# Patient Record
Sex: MALE | Race: WHITE | Employment: OTHER | URBAN - METROPOLITAN AREA
[De-identification: names, ages, dates, MRNs, and addresses within clinical notes are randomized per-mention and may not be internally consistent; named-entity substitution may affect disease eponyms.]

---

## 2021-12-15 ENCOUNTER — HOSPITAL ENCOUNTER (OUTPATIENT)
Dept: PHYSICAL THERAPY | Age: 66
Discharge: HOME | End: 2021-12-15
Payer: MEDICARE

## 2021-12-15 PROCEDURE — 97162 PT EVAL MOD COMPLEX 30 MIN: CPT

## 2021-12-15 PROCEDURE — 97110 THERAPEUTIC EXERCISES: CPT

## 2021-12-15 NOTE — PROGRESS NOTES
[address] [facility] PHYSICAL THERAPY AT [facility] [address], [address], [address]  Phone: [phone]  Fax: [phone] / [address] [address] PHYSICAL THERAPY SERVICES  Patient Name: Lizzette Waggoner : 1955   Medical   Diagnosis: Right shoulder pain [M25.511] Treatment Diagnosis: S/p R reverse TSA   Onset Date: 2021     Referral Source: Aylin Ceron* Start of Care [facility]): 12/15/2021   Prior Hospitalization: See medical history Provider #: [id number]   Prior Level of Function: Shooting, sports, walking, and ADL's without pain   Comorbidities: High blood pressure, non-specific tremors. Medications: Verified on Patient Summary List   The Plan of Care and following information is based on the information from the initial evaluation.   ===========================================================================================  Assessment / key information:  Patient is a 78 y.o. male who presents to [facility] with complaints of R shoulder pain following R shoulder reverse TSA on 2021. Patient reports onset of symptoms on 2021 following a fall onto R shoulder causing 10/10 pain. Pt reports that since surgery, pain has remained achy and low around 2-3/10 and that he is compliant with sling use and RUE positioning while at rest. Pt has been performing elbow and wrist AAROM daily but has not attempted pendulums and has wife help with donning/doffing sling. Incision appears clean and healing well although there is yellow/purple bruising and swelling throughout arm, elbow and forearm; reports he is monitoring daily and that is is slowly improving. At initial session, pt was able to achieve PROM of 60 deg flexion and near neutral ER. Patient sings and sx are consistent with pain and discomfort following R shoulder reverse TSA. An initial HEP was provided to address above deficits.  Physical Therapy services will be beneficial in order to decrease pain, improve ROM, strength and functional abilities in order to return to recreational activities and PLOF.   ===========================================================================================  Eval Complexity: History MEDIUM  Complexity : 1-2 comorbidities / personal factors will impact the outcome/ POC ;  Examination  MEDIUM Complexity : 3 Standardized tests and measures addressing body structure, function, activity limitation and / or participation in recreation ; Presentation MEDIUM Complexity : Evolving with changing characteristics ; Decision Making MEDIUM Complexity : FOTO score of 26-74; Overall Complexity MEDIUM  Problem List: pain affecting function, decrease ROM, decrease strength, edema affecting function, impaired gait/ balance, decrease ADL/ functional abilitiies, decrease activity tolerance, decrease flexibility/ joint mobility and decrease transfer abilities   Treatment Plan may include any combination of the following: Therapeutic exercise, Therapeutic activities, Neuromuscular re-education, Physical agent/modality, Gait/balance training, Manual therapy, Patient education, Self Care training, Functional mobility training, Home safety training and Stair training  Patient / Family readiness to learn indicated by: asking questions, trying to perform skills and interest  Persons(s) to be included in education: patient (P)  Barriers to Learning/Limitations: None  Measures taken, if barriers to learning:    Patient Goal (s): \"movement/flexibility\"   Patient self reported health status: good  Rehabilitation Potential: good   Short Term Goals: To be accomplished in  4  weeks:  1. Patient will be independent and compliant with initial HEP. 2. Patient will report decreased pain levels to 0/10 in order to sleep through the night pain-free  3.  Demonstrate improved R shoulder PROM to 120 deg flexion and 40 deg ER in order to improve ease with dressing   Long Term Goals: To be accomplished in  8  weeks:  1. Patient will be independent and compliant with progressive HEP for R shoulder ROM/strength in order to maintain gains made with physical therapy. 2. Improve FOTO score from 28 to > or = 57 in order to indicate  improved ease with ADLs. 3. Demonstrate improved R shoulder strength to globally 4/5 in order to improve ease with recreational activity. ,   Frequency / Duration:   Patient to be seen  2  times per week for 4-6  weeks:  Patient / Caregiver education and instruction: self care, activity modification, brace/ splint application and exercises  Therapist Signature: Veronica Page DPT Date: 67/43/7041   Certification Period: 12/15/2021-3/13/2021 Time: 12:30 PM   ===========================================================================================  I certify that the above Physical Therapy Services are being furnished while the patient is under my care. I agree with the treatment plan and certify that this therapy is necessary. Physician Signature:        Date:       Time:     Please sign and return to [facility] [place] or you may fax the signed copy to [phone]. Thank you.     Danuta Amador

## 2021-12-16 NOTE — PROGRESS NOTES
PHYSICAL THERAPY - DAILY TREATMENT NOTE    Patient Name: Cristhian Solis        Date: 2021  : 1955   YES Patient  Verified  Visit #:     Insurance: Payor: Harsh Gonzáles / Plan: [place] MEDICARE PART A & B / Product Type: Medicare /      In time: 525 Out time: 120   Total Treatment Time: 50     BCBS/Medicare Time Tracking (below)   Total Timed Codes (min):  40 1:1 Treatment Time:  40     TREATMENT AREA =  Right shoulder pain [M25.511]    SUBJECTIVE  Pain Level (on 0 to 10 scale):  2-3  / 10   Medication Changes/New allergies or changes in medical history, any new surgeries or procedures? NO    If yes, update Summary List   Subjective Functional Status/Changes:  []  No changes reported     Patient is a 78 y.o. male who presents to [facility] with complaints of R shoulder pain following R shoulder reverse TSA on 2021. Modalities Rationale:     decrease edema, decrease inflammation and decrease pain to improve patient's ability to perform pain-free ADLs.     min [] Estim, type/location:                                      []  att     []  unatt     []  w/US     []  w/ice    []  w/heat    min []  Mechanical Traction: type/lbs                   []  pro   []  sup   []  int   []  cont    []  before manual    []  after manual    min []  Ultrasound, settings/location:      min []  Iontophoresis w/ dexamethasone, location:                                               []  take home patch       []  in clinic   10 min [x]  Ice     []  Heat    location/position: R shoulder supine    min []  Vasopneumatic Device, press/temp:    If using vaso (only need to measure limb vaso being performed on)      pre-treatment girth :       post-treatment girth :       measured at (landmark location) :      min []  Other:    [] Skin assessment post-treatment (if applicable):    []  intact    []  redness- no adverse reaction                  []redness  adverse reaction:        15 min Therapeutic Exercise:  [x]  See flow sheet   Rationale:      increase ROM, increase strength, improve coordination and improve balance to improve the patients ability to perform unlimited ADLs. Billed With/As:   [x] TE   [] TA   [] Neuro   [] Self Care Patient Education: [x] Review HEP    [] Progressed/Changed HEP based on:   [] positioning   [] body mechanics   [] transfers   [] heat/ice application    [] other:      Other Objective/Functional Measures:    FOTO 28     Post Treatment Pain Level (on 0 to 10) scale:   2  / 10     ASSESSMENT  Assessment/Changes in Function:     See POC     []  See Progress Note/Recertification   Patient will continue to benefit from skilled PT services to modify and progress therapeutic interventions, address functional mobility deficits, address ROM deficits, address strength deficits, analyze and address soft tissue restrictions, analyze and cue movement patterns, analyze and modify body mechanics/ergonomics and assess and modify postural abnormalities to attain remaining goals. Progress toward goals / Updated goals:    See POC for new short and long term goals.       PLAN  [x]  Upgrade activities as tolerated YES Continue plan of care   []  Discharge due to :    []  Other:      Therapist: Avani Garcia DPT    Date: 12/16/2021 Time: 1:58 PM     Future Appointments   Date Time Provider Danny Cortes   12/17/2021 12:15 PM [facility] PT GUDELIA 1 [facility]   12/21/2021 11:45 AM Jordi Jarrell, PT [facility]   12/23/2021 11:45 AM Jordi Jarrell, PT [facility]   12/28/2021 11:45 AM Jordi Jarrell, PT [facility]   12/30/2021 11:45 AM Jordi Jarrell, PT [facility]   1/4/2022 12:45 PM Jordi Jarrell, PT [facility]   1/6/2022 11:45 AM Jordi Jarrell, PT [facility]   1/11/2022 12:45 PM Jordi Jarrell, PT [facility]   1/13/2022  2:15 PM Jordi Jarrell, PT [facility]   1/18/2022 12:45 PM Jordi Jarrell, PT [facility]   1/20/2022 11:15 AM [facility] PT LILLIANALL 1 [facility]   1/25/2022 10:30 AM Bartolo Chowdary, PT [facility]   1/27/2022 11:45 AM Bartolo Chowdary PT [facility]

## 2021-12-17 ENCOUNTER — HOSPITAL ENCOUNTER (OUTPATIENT)
Dept: PHYSICAL THERAPY | Age: 66
Discharge: HOME | End: 2021-12-17
Payer: MEDICARE

## 2021-12-17 PROCEDURE — 97140 MANUAL THERAPY 1/> REGIONS: CPT

## 2021-12-17 PROCEDURE — 97110 THERAPEUTIC EXERCISES: CPT

## 2021-12-17 NOTE — PROGRESS NOTES
PHYSICAL THERAPY - DAILY TREATMENT NOTE     Patient Name: Christofer Mays        Date: 2021  : 1955   YES Patient  Verified  Visit #:     Insurance: Payor: Veto Schwartz / Plan: [place] MEDICARE PART A & B / Product Type: Medicare /      In time: 6166 Out time:    Total Treatment Time: 40     Medicare/BCBS Time Tracking (below)   Total Timed Codes (min):  40 1:1 Treatment Time:  30     TREATMENT AREA =  Right shoulder pain [M25.511]    SUBJECTIVE    Pain Level (on 0 to 10 scale):  1-2  / 10   Medication Changes/New allergies or changes in medical history, any new surgeries or procedures?     NO    If yes, update Summary List   Subjective Functional Status/Changes:  []  No changes reported       Functional improvements: \"I've been doing the exercises at home\"  Functional impairments: Decreased ROM and strength affecting all ADL's         OBJECTIVE  Modalities Rationale:     decrease edema, decrease inflammation and decrease pain to improve patient's ability to perform ADL's w/ less pain      min [] Estim, type/location:                                      []  att     []  unatt     []  w/US     []  w/ice    []  w/heat    min []  Mechanical Traction: type/lbs                   []  pro   []  sup   []  int   []  cont    []  before manual    []  after manual    min []  Ultrasound, settings/location:      min []  Iontophoresis w/ dexamethasone, location:                                               []  take home patch       []  in clinic   10 min [x]  Ice     []  Heat    location/position: Supine to R shoulder    min []  Vasopneumatic Device, press/temp:  If using vaso (only need to measure limb vaso being performed on)      pre-treatment girth :       post-treatment girth :       measured at (landmark location) :       min []  Other:    [x] Skin assessment post-treatment (if applicable):    [x]  intact    [x]  redness- no adverse reaction     []redness  adverse reaction:      10 min Therapeutic Exercise:  [x]  See flow sheet   Rationale:      increase ROM and increase strength to improve the patients ability to perform ADL's and decrease edema     20 min Manual Therapy: Technique:      [x] S/DTM []IASTM [x]PROM [x] Passive Stretching   []manual TPR    []Jt manipulation:Gr I [] II []  III [] IV[] V[]  Treatment Area:  [facility] for edema management in forearm. PROM to R elbow and shoulder   Rationale:      decrease pain, increase ROM, increase tissue extensibility and decrease edema  to improve patient's ability to prepare for AAROM and ADL's. The manual therapy interventions were performed at a separate and distinct time from the therapeutic activities interventions. Billed With/As:   [x] TE   [] TA   [] Neuro   [] Self Care Patient Education: [x] Review HEP    [] Progressed/Changed HEP based on:   [] positioning   [] body mechanics   [] transfers   [] heat/ice application    [] other:        Other Objective/Functional Measures:    Initiated therex per flow sheet  Flex 80 deg PROM   Post Treatment Pain Level (on 0 to 10) scale:   1  / 10     ASSESSMENT    Assessment/Changes in Function:     Improved PROM noted today. Continues to have increased muscle guarding and edema in R UE.     []  See Progress Note/Recertification   Patient will continue to benefit from skilled PT services to modify and progress therapeutic interventions, address functional mobility deficits, address ROM deficits, address strength deficits, analyze and address soft tissue restrictions, analyze and cue movement patterns, analyze and modify body mechanics/ergonomics and assess and modify postural abnormalities to attain remaining goals.       Progress toward goals / Updated goals:    Progressing towards newly established goals     PLAN    [x]  Upgrade activities as tolerated YES Continue plan of care   []  Discharge due to :    []  Other:      Therapist: Dangelo Rasmussen, PT    Date: 12/17/2021 Time: 9:02 AM     Future Appointments   Date Time Provider Danny Cortes   12/17/2021 12:15 PM [facility] [place] [facility]   12/21/2021 11:45 AM Holden Maria, PT [facility]   12/23/2021 11:45 AM Holden Maria, PT [facility]   12/28/2021 11:45 AM Holden Maria, PT [facility]   12/30/2021 11:45 AM Holden Maria, PT [facility]   1/4/2022 12:45 PM Holden Maria, PT [facility]   1/6/2022 11:45 AM Holden Maria, PT [facility]   1/11/2022 12:45 PM [facility]   1/13/2022  2:15 PM Holden Maria, PT [facility]   1/18/2022 12:45 PM Holden Maria, PT [facility]   1/20/2022 11:15 AM [facility] PT RED[place]LL 1 [facility]   1/25/2022 10:30 AM Holden Maria, PT [facility]   1/27/2022 11:45 AM Holden Maria, PT [facility]

## 2021-12-20 ENCOUNTER — APPOINTMENT (OUTPATIENT)
Dept: PHYSICAL THERAPY | Age: 66
End: 2021-12-20
Payer: MEDICARE

## 2021-12-21 ENCOUNTER — HOSPITAL ENCOUNTER (OUTPATIENT)
Dept: PHYSICAL THERAPY | Age: 66
Discharge: HOME | End: 2021-12-21
Payer: MEDICARE

## 2021-12-21 PROCEDURE — 97110 THERAPEUTIC EXERCISES: CPT

## 2021-12-21 PROCEDURE — 97140 MANUAL THERAPY 1/> REGIONS: CPT

## 2021-12-21 NOTE — PROGRESS NOTES
PHYSICAL THERAPY - DAILY TREATMENT NOTE    Patient Name: Cesar Cornelius        Date: 2021  : 1955   YES Patient  Verified  Visit #:   3   of   12  Insurance: Payor: Kristen Hazard / Plan: [place] MEDICARE PART A & B / Product Type: Medicare /      In time: 680 Out time: 9430   Total Treatment Time: 45     BCBS/Medicare Time Tracking (below)   Total Timed Codes (min):  35 1:1 Treatment Time:  35     TREATMENT AREA =  Right shoulder pain [M25.511]    SUBJECTIVE  Pain Level (on 0 to 10 scale):  1  / 10   Medication Changes/New allergies or changes in medical history, any new surgeries or procedures? NO    If yes, update Summary List   Subjective Functional Status/Changes:  []  No changes reported     I dont have much pain and my bruising is going down a lot. Modalities Rationale:     decrease edema, decrease inflammation and decrease pain to improve patient's ability to perform pain-free ADLs.     min [] Estim, type/location:                                      []  att     []  unatt     []  w/US     []  w/ice    []  w/heat    min []  Mechanical Traction: type/lbs                   []  pro   []  sup   []  int   []  cont    []  before manual    []  after manual    min []  Ultrasound, settings/location:      min []  Iontophoresis w/ dexamethasone, location:                                               []  take home patch       []  in clinic   10 min [x]  Ice     []  Heat    location/position: R shoulder supine    min []  Vasopneumatic Device, press/temp:    If using vaso (only need to measure limb vaso being performed on)      pre-treatment girth :       post-treatment girth :       measured at (landmark location) :      min []  Other:    [x] Skin assessment post-treatment (if applicable):    [x]  intact    [x]  redness- no adverse reaction                  []redness  adverse reaction:        20 min Therapeutic Exercise:  [x]  See flow sheet   Rationale:      increase ROM, increase strength and improve coordination to improve the patients ability to perform unlimited ADLs. 15 min Manual Therapy: PROM into elevation and ER with oscillations to improve muscle relaxation, MFR and skin rolling along RUE to improve blood flow    Rationale:      decrease pain, increase ROM, increase tissue extensibility and decrease edema  to improve patient's ability to improve mobility per protocol  The manual therapy interventions were performed at a separate and distinct time from the therapeutic activities interventions. Billed With/As:   [x] TE   [] TA   [] Neuro   [] Self Care Patient Education: [x] Review HEP    [] Progressed/Changed HEP based on:   [] positioning   [] body mechanics   [] transfers   [] heat/ice application    [] other:      Other Objective/Functional Measures:    Achieved 90 deg flex and approx neutral ER with manual treatment    Palpation to pec tendon improved pt ability to relax during manual stretches     Post Treatment Pain Level (on 0 to 10) scale:   0  / 10     ASSESSMENT  Assessment/Changes in Function:     Continues to require cues with pendulums to allow for appropriate muscle relaxation to achieve stretch. Good tolerance to exercises without reports of pain     []  See Progress Note/Recertification   Patient will continue to benefit from skilled PT services to modify and progress therapeutic interventions, address functional mobility deficits, address ROM deficits, address strength deficits, analyze and address soft tissue restrictions, analyze and cue movement patterns, analyze and modify body mechanics/ergonomics and assess and modify postural abnormalities to attain remaining goals.    Progress toward goals / Updated goals:    Progressing towards STG 3,      PLAN  [x]  Upgrade activities as tolerated YES Continue plan of care   []  Discharge due to :    []  Other:      Therapist: Avani Garcia DPT    Date: 12/21/2021 Time: 11:52 AM     Future Appointments   Date Time Provider Department Center   12/23/2021 11:45 AM [facility]   12/28/2021 11:45 AM Leanora Poser, PT [facility]   12/30/2021 11:45 AM Leanora Nadeenr, PT [facility]   1/4/2022 12:45 PM Leanora Nadeenr, PT [facility]   1/6/2022 11:45 AM Leanora Nadeenr, PT [facility]   1/11/2022 12:45 PM Yunierora Katie, PT [facility]   1/13/2022  2:15 PM Tatum Wilson, PT [facility]   1/18/2022 12:45 PM Leanora Nadeenr, PT [facility]   1/20/2022 11:15 AM [facility] PT GUDELIA 1 [facility]   1/25/2022 10:30 AM Tatum Wilson, PT [facility]   1/27/2022 11:45 AM Leanora Katie, PT [facility]

## 2021-12-23 ENCOUNTER — HOSPITAL ENCOUNTER (OUTPATIENT)
Dept: PHYSICAL THERAPY | Age: 66
Discharge: HOME | End: 2021-12-23
Payer: MEDICARE

## 2021-12-23 PROCEDURE — 97110 THERAPEUTIC EXERCISES: CPT

## 2021-12-23 PROCEDURE — 97140 MANUAL THERAPY 1/> REGIONS: CPT

## 2021-12-23 NOTE — PROGRESS NOTES
PHYSICAL THERAPY - DAILY TREATMENT NOTE    Patient Name: Agusto Last        Date: 2021  : 1955   YES Patient  Verified  Visit #:     Insurance: Payor: Ayde Braswell / Plan: [place] & B / Product Type: Medicare /      In time: 4556 Out time: 0   Total Treatment Time: 45     BCBS/Medicare Time Tracking (below)   Total Timed Codes (min):  35 1:1 Treatment Time:  35     TREATMENT AREA =  Right shoulder pain [M25.511]    SUBJECTIVE  Pain Level (on 0 to 10 scale):  1  / 10   Medication Changes/New allergies or changes in medical history, any new surgeries or procedures? NO    If yes, update Summary List   Subjective Functional Status/Changes:  []  No changes reported     I was a little achy after last time but it went away with another round of ice         Modalities Rationale:     decrease edema, decrease inflammation and decrease pain to improve patient's ability to perform pain-free ADLs.     min [] Estim, type/location:                                      []  att     []  unatt     []  w/US     []  w/ice    []  w/heat    min []  Mechanical Traction: type/lbs                   []  pro   []  sup   []  int   []  cont    []  before manual    []  after manual    min []  Ultrasound, settings/location:      min []  Iontophoresis w/ dexamethasone, location:                                               []  take home patch       []  in clinic   10 min [x]  Ice     []  Heat    location/position: R shoulder supine    min []  Vasopneumatic Device, press/temp:    If using vaso (only need to measure limb vaso being performed on)      pre-treatment girth :       post-treatment girth :       measured at (landmark location) :      min []  Other:    [x] Skin assessment post-treatment (if applicable):    [x]  intact    [x]  redness- no adverse reaction                  []redness  adverse reaction:        20 min Therapeutic Exercise:  [x]  See flow sheet   Rationale:      increase ROM, increase strength and improve coordination to improve the patients ability to perform unlimited ADLs. 15 min Manual Therapy: PROM into elevation and ER with oscillations to improve muscle relaxation, MFR and skin rolling along RUE to improve blood flow    Rationale:      decrease pain, increase ROM, increase tissue extensibility and decrease edema  to improve patient's ability to improve mobility per protocol  The manual therapy interventions were performed at a separate and distinct time from the therapeutic activities interventions. Billed With/As:   [x] TE   [] TA   [] Neuro   [] Self Care Patient Education: [x] Review HEP    [] Progressed/Changed HEP based on:   [] positioning   [] body mechanics   [] transfers   [] heat/ice application    [] other:      Other Objective/Functional Measures:    Achieved 90 deg flex and approx 5-8 deg ER at 30 deg abd    Palpation to pec tendon improved pt ability to relax during manual stretches     Post Treatment Pain Level (on 0 to 10) scale:   0  / 10     ASSESSMENT  Assessment/Changes in Function:     Improved tolerance to manual stretches today and was able to achieve full PROM during pendulums. []  See Progress Note/Recertification   Patient will continue to benefit from skilled PT services to modify and progress therapeutic interventions, address functional mobility deficits, address ROM deficits, address strength deficits, analyze and address soft tissue restrictions, analyze and cue movement patterns, analyze and modify body mechanics/ergonomics and assess and modify postural abnormalities to attain remaining goals.    Progress toward goals / Updated goals:    Progressing towards STG 3,      PLAN  [x]  Upgrade activities as tolerated YES Continue plan of care   []  Discharge due to :    []  Other:      Therapist: Minh Vega DPT    Date: 12/23/2021 Time: 11:52 AM     Future Appointments   Date Time Provider Danny Cortes   12/28/2021 11:45 AM Carlton Brown, PT [facility] [place] [facility]   12/30/2021 11:45 AM Carlton Brown, PT [facility]   1/4/2022 12:45 PM Carlton Brown, PT [facility]   1/6/2022 11:45 AM Carlton Brown, PT [facility]   1/11/2022 12:45 PM Carlton Brown, PT [facility]   1/13/2022  2:15 PM Carlton Brown, PT [facility]   1/18/2022 12:45 PM Carlton Brown, PT [facility]   1/20/2022 11:15 AM [facility] PT [facility]   1/25/2022 10:30 AM Carlton Brown, PT [facility]   1/27/2022 11:45 AM Carlton Brown, PT [facility]

## 2021-12-27 ENCOUNTER — APPOINTMENT (OUTPATIENT)
Dept: PHYSICAL THERAPY | Age: 66
End: 2021-12-27
Payer: MEDICARE

## 2021-12-28 ENCOUNTER — HOSPITAL ENCOUNTER (OUTPATIENT)
Dept: PHYSICAL THERAPY | Age: 66
Discharge: HOME | End: 2021-12-28
Payer: MEDICARE

## 2021-12-28 PROCEDURE — 97110 THERAPEUTIC EXERCISES: CPT

## 2021-12-28 PROCEDURE — 97140 MANUAL THERAPY 1/> REGIONS: CPT

## 2021-12-28 NOTE — PROGRESS NOTES
PHYSICAL THERAPY - DAILY TREATMENT NOTE    Patient Name: Brayden Benitez        Date: 2021  : 1955   YES Patient  Verified  Visit #:     Insurance: Payor: Yvonne Palomo / Plan: [place] MEDICARE PART A & B / Product Type: Medicare /      In time: 1140 Out time: 1225   Total Treatment Time: 45     BCBS/Medicare Time Tracking (below)   Total Timed Codes (min):  35 1:1 Treatment Time:  35     TREATMENT AREA =  Right shoulder pain [M25.511]    SUBJECTIVE  Pain Level (on 0 to 10 scale):  1  / 10   Medication Changes/New allergies or changes in medical history, any new surgeries or procedures? NO    If yes, update Summary List   Subjective Functional Status/Changes:  []  No changes reported     I feel less resting pain and the swelling/bruising is getting better. Modalities Rationale:     decrease edema, decrease inflammation and decrease pain to improve patient's ability to perform pain-free ADLs.     min [] Estim, type/location:                                      []  att     []  unatt     []  w/US     []  w/ice    []  w/heat    min []  Mechanical Traction: type/lbs                   []  pro   []  sup   []  int   []  cont    []  before manual    []  after manual    min []  Ultrasound, settings/location:      min []  Iontophoresis w/ dexamethasone, location:                                               []  take home patch       []  in clinic   10 min [x]  Ice     []  Heat    location/position: R shoulder seated    min []  Vasopneumatic Device, press/temp:    If using vaso (only need to measure limb vaso being performed on)      pre-treatment girth :       post-treatment girth :       measured at (landmark location) :      min []  Other:    [x] Skin assessment post-treatment (if applicable):    [x]  intact    [x]  redness- no adverse reaction                  []redness  adverse reaction:        20 min Therapeutic Exercise:  [x]  See flow sheet   Rationale:      increase ROM, increase strength, improve coordination and increase proprioception to improve the patients ability to perform unlimited ADLs. 15 min Manual Therapy: PROM into elevation and ER with oscillations to improve muscle relaxation, MFR and skin rolling along RUE to improve blood flow    Rationale:      decrease pain, increase ROM, increase tissue extensibility and decrease edema  to improve patient's ability to improve ROM per protocol  The manual therapy interventions were performed at a separate and distinct time from the therapeutic activities interventions. Billed With/As:   [x] TE   [] TA   [] Neuro   [] Self Care Patient Education: [x] Review HEP    [] Progressed/Changed HEP based on:   [] positioning   [] body mechanics   [] transfers   [] heat/ice application    [] other:      Other Objective/Functional Measures: Added chair walk back and shoulder isometrics     Post Treatment Pain Level (on 0 to 10) scale:   0  / 10     ASSESSMENT  Assessment/Changes in Function:     Progressed PROM per protocol as pt is 3 weeks post op today. Tolerated exercises well but continues to require cues to promote appropriate muscle relaxation and prevent guarding. []  See Progress Note/Recertification   Patient will continue to benefit from skilled PT services to modify and progress therapeutic interventions, address functional mobility deficits, address ROM deficits, address strength deficits, analyze and address soft tissue restrictions, analyze and cue movement patterns, analyze and modify body mechanics/ergonomics and assess and modify postural abnormalities to attain remaining goals. Progress toward goals / Updated goals:    Progressing towards STG 3.       PLAN  [x]  Upgrade activities as tolerated YES Continue plan of care   []  Discharge due to :    []  Other:      Therapist: Joseph Rutledge DPT    Date: 12/28/2021 Time: 11:45 AM     Future Appointments   Date Time Provider Danny Cortes   12/30/2021 11:45 AM [facility] [place] [facility]   1/4/2022 12:45 PM Adeline Perry, PT [facility]   1/6/2022 11:45 AM Adeline Perry, PT [facility]   1/11/2022 12:45 PM Adeline Perry, PT [facility]   1/13/2022  2:15 PM Adeline Perry, PT [facility]   1/18/2022 12:45 PM Adeline Perry, PT [facility]   1/20/2022 11:15 AM [facility]   1/25/2022 10:30 AM Adeline Perry, PT [facility]   1/27/2022 11:45 AM Adeline Perry, PT [facility]

## 2021-12-29 ENCOUNTER — APPOINTMENT (OUTPATIENT)
Dept: PHYSICAL THERAPY | Age: 66
End: 2021-12-29
Payer: MEDICARE

## 2021-12-30 ENCOUNTER — HOSPITAL ENCOUNTER (OUTPATIENT)
Dept: PHYSICAL THERAPY | Age: 66
Discharge: HOME | End: 2021-12-30
Payer: MEDICARE

## 2021-12-30 PROCEDURE — 97110 THERAPEUTIC EXERCISES: CPT

## 2021-12-30 PROCEDURE — 97140 MANUAL THERAPY 1/> REGIONS: CPT

## 2021-12-30 NOTE — PROGRESS NOTES
PHYSICAL THERAPY - DAILY TREATMENT NOTE    Patient Name: Lynda Floyd        Date: 2021  : 1955   YES Patient  Verified  Visit #:     Insurance: Payor: Mayela Alegria / Plan: [place] MEDICARE PART A & B / Product Type: Medicare /      In time: 1340 Out time: 2618   Total Treatment Time: 45     BCBS/Medicare Time Tracking (below)   Total Timed Codes (min):  35 1:1 Treatment Time:  35     TREATMENT AREA =  Right shoulder pain [M25.511]    SUBJECTIVE  Pain Level (on 0 to 10 scale):  1  / 10   Medication Changes/New allergies or changes in medical history, any new surgeries or procedures? NO    If yes, update Summary List   Subjective Functional Status/Changes:  []  No changes reported     A little sore after last time but it went away after ice. Modalities Rationale:     decrease edema, decrease inflammation and decrease pain to improve patient's ability to perform pain-free ADLs.     min [] Estim, type/location:                                      []  att     []  unatt     []  w/US     []  w/ice    []  w/heat    min []  Mechanical Traction: type/lbs                   []  pro   []  sup   []  int   []  cont    []  before manual    []  after manual    min []  Ultrasound, settings/location:      min []  Iontophoresis w/ dexamethasone, location:                                               []  take home patch       []  in clinic   10 min [x]  Ice     []  Heat    location/position: R shoulder seated    min []  Vasopneumatic Device, press/temp:    If using vaso (only need to measure limb vaso being performed on)      pre-treatment girth :       post-treatment girth :       measured at (landmark location) :      min []  Other:    [x] Skin assessment post-treatment (if applicable):    [x]  intact    [x]  redness- no adverse reaction                  []redness  adverse reaction:        20 min Therapeutic Exercise:  [x]  See flow sheet   Rationale:      increase ROM, increase strength, improve coordination and increase proprioception to improve the patients ability to perform unlimited ADLs. 15 min Manual Therapy: PROM into elevation and ER with oscillations to improve muscle relaxation, MFR and skin rolling along RUE to improve blood flow    Rationale:      decrease pain, increase ROM, increase tissue extensibility and decrease edema  to improve patient's ability to improve ROM per protocol  The manual therapy interventions were performed at a separate and distinct time from the therapeutic activities interventions. Billed With/As:   [x] TE   [] TA   [] Neuro   [] Self Care Patient Education: [x] Review HEP    [] Progressed/Changed HEP based on:   [] positioning   [] body mechanics   [] transfers   [] heat/ice application    [] other:      Other Objective/Functional Measures:    Increased reps with chair walk back and shoulder isometrics    105 deg flexion and 20 deg ER with manual treatment     Post Treatment Pain Level (on 0 to 10) scale:   0  / 10     ASSESSMENT  Assessment/Changes in Function:     Good tolerance to progressed exercises today without reports of pain; improved muscle relaxation during manual treatment. []  See Progress Note/Recertification   Patient will continue to benefit from skilled PT services to modify and progress therapeutic interventions, address functional mobility deficits, address ROM deficits, address strength deficits, analyze and address soft tissue restrictions, analyze and cue movement patterns, analyze and modify body mechanics/ergonomics and assess and modify postural abnormalities to attain remaining goals. Progress toward goals / Updated goals:    Progressing towards STG 3.       PLAN  [x]  Upgrade activities as tolerated YES Continue plan of care   []  Discharge due to :    []  Other:      Therapist: Fauzia Becerra DPT    Date: 12/30/2021 Time: 11:45 AM     Future Appointments   Date Time Provider Danny Cortes   1/4/2022 12:45 PM Jaye Escalante [place] [facility]   1/6/2022 11:45 AM Adrian Saldaña, PT [facility]   1/11/2022 12:45 PM Adrian Saldaña, PT [facility]   1/13/2022  2:15 PM Adrian Saldaña, PT [facility]   1/18/2022 12:45 PM Adrian Saldaña, PT [facility]   1/20/2022 11:15 AM [facility]   1/25/2022 10:30 AM Adrian Saldaña, PT [facility]   1/27/2022 11:45 AM Adrian Saldaña, PT [facility]

## 2022-01-04 ENCOUNTER — HOSPITAL ENCOUNTER (OUTPATIENT)
Dept: PHYSICAL THERAPY | Age: 67
Discharge: HOME | End: 2022-01-04
Payer: MEDICARE

## 2022-01-04 PROCEDURE — 97110 THERAPEUTIC EXERCISES: CPT

## 2022-01-04 PROCEDURE — 97140 MANUAL THERAPY 1/> REGIONS: CPT

## 2022-01-04 NOTE — PROGRESS NOTES
PHYSICAL THERAPY - DAILY TREATMENT NOTE     Patient Name: Van Talavera        Date: 2022  : 1955   YES Patient  Verified  Visit #:     Insurance: Payor: Dagoberto Abbot / Plan: [place] MEDICARE PART A & B / Product Type: Medicare /      In time: 9149 Out time: 130   Total Treatment Time: 45     Medicare/BCBS Time Tracking (below)   Total Timed Codes (min):  45 1:1 Treatment Time:  35     TREATMENT AREA =  Right shoulder pain [M25.511]    SUBJECTIVE    Pain Level (on 0 to 10 scale):  1  / 10   Medication Changes/New allergies or changes in medical history, any new surgeries or procedures? NO    If yes, update Summary List   Subjective Functional Status/Changes:  []  No changes reported       Functional improvements: \"I see the doctor on Monday.   I'm doing better with the swelling in my arm\"  Functional impairments: Decreased strength and ROM affecting ADL's         OBJECTIVE  Modalities Rationale:     decrease inflammation and decrease pain to improve patient's ability to perform ADL's w/o pain      min [] Estim, type/location:                                      []  att     []  unatt     []  w/US     []  w/ice    []  w/heat    min []  Mechanical Traction: type/lbs                   []  pro   []  sup   []  int   []  cont    []  before manual    []  after manual    min []  Ultrasound, settings/location:      min []  Iontophoresis w/ dexamethasone, location:                                               []  take home patch       []  in clinic   10 min [x]  Ice     []  Heat    location/position: Supine to R shoulder    min []  Vasopneumatic Device, press/temp:  If using vaso (only need to measure limb vaso being performed on)      pre-treatment girth :       post-treatment girth :       measured at (landmark location) :       min []  Other:    [x] Skin assessment post-treatment (if applicable):    [x]  intact    [x]  redness- no adverse reaction     []redness  adverse reaction:      25 min Therapeutic Exercise:  [x]  See flow sheet   Rationale:      increase ROM and increase strength to improve the patients ability to prepare for AAROM     10 min Manual Therapy: Technique:      [] S/DTM []IASTM [x]PROM [x] Passive Stretching   []manual TPR    []Jt manipulation:Gr I [] II []  III [] IV[] V[]  Treatment Area:  R shoulder   Rationale:      increase ROM and increase tissue extensibility to improve patient's ability to perform ADL's. The manual therapy interventions were performed at a separate and distinct time from the therapeutic activities interventions. Billed With/As:   [x] TE   [] TA   [] Neuro   [] Self Care Patient Education: [x] Review HEP    [] Progressed/Changed HEP based on:   [] positioning   [] body mechanics   [] transfers   [] heat/ice application    [] other:        Other Objective/Functional Measures:    Increased light resistance to elbow flexion/extension, wrist flex/ext, increased gripper strength     Post Treatment Pain Level (on 0 to 10) scale:   0  / 10     ASSESSMENT    Assessment/Changes in Function:     Patient progressing well with PROM and strength. Continues to have increased muscle guarding with PROM     []  See Progress Note/Recertification   Patient will continue to benefit from skilled PT services to modify and progress therapeutic interventions, address functional mobility deficits, address ROM deficits, address strength deficits, analyze and address soft tissue restrictions, analyze and cue movement patterns, analyze and modify body mechanics/ergonomics and assess and modify postural abnormalities to attain remaining goals. Progress toward goals / Updated goals:    1. Patient will be independent and compliant with initial HEP. MET  2. Patient will report decreased pain levels to 0/10 in order to sleep through the night pain-free. Good Progress  3. Demonstrate improved R shoulder PROM to 120 deg flexion and 40 deg ER in order to improve ease with dressing.  Good Progress       PLAN    [x]  Upgrade activities as tolerated YES Continue plan of care   []  Discharge due to :    [x]  Other: PN N/V     Therapist: Truong Yancey PT    Date: 1/4/2022 Time: 9:34 AM     Future Appointments   Date Time Provider Danny Cortes   1/4/2022 12:45 PM [facility] [place]   1/6/2022 11:45 AM [facility] [place]   1/11/2022 12:45 PM Tommye Gene, PT [facility]   1/13/2022  2:15 PM Tommye Gene, [place] [facility] [place] [place] [facility]   1/18/2022 12:45 PM Tommye Gene, PT [facility] [place] [facility]   1/20/2022 11:15 AM [facility] [place]   1/25/2022 10:30 AM Tommye Gene, [place] [facility] [place] [facility]   1/27/2022 11:45 AM Tommye Gene, PT [facility]

## 2022-01-06 ENCOUNTER — HOSPITAL ENCOUNTER (OUTPATIENT)
Dept: PHYSICAL THERAPY | Age: 67
Discharge: HOME | End: 2022-01-06
Payer: MEDICARE

## 2022-01-06 PROCEDURE — 97140 MANUAL THERAPY 1/> REGIONS: CPT

## 2022-01-06 PROCEDURE — 97110 THERAPEUTIC EXERCISES: CPT

## 2022-01-06 NOTE — PROGRESS NOTES
[address] [facility] PHYSICAL THERAPY AT [address] [place] [address]  [facility] 38, [address] [place] [address], [address] [facility] [address]  Phone: [phone]  Fax: [phone] [place] [address] THERAPY          Patient Name: Diana Sebastian : 1955   Treatment/Medical Diagnosis: Right shoulder pain [M25.511]   Onset Date: 21    Referral Source: Russell Bland* Start of Care [facility]): 12/15/21   Prior Hospitalization: See Medical History Provider #: [id number]   Prior Level of Function: Shooting, sports, walking, ADL's without pain   Comorbidities: HTN, non-specific tremors   Medications: Verified on Patient Summary List   Visits from [facility]: 8 Missed Visits: 0     SUMMARY OF TREATMENT  Initiation of HEP, instruction in self care and precautions, manual therapy for PROM, therapeutic exercise for ROM and strengthening, modalities: cryotherapy. CURRENT STATUS  Mr. Blake Watson has made good gains in his physical therapy consistently reporting improved pain and decreased swelling in the R upper extremity. He continues to report mild edema in the lower part of his R UE and mild pain after having his arm out of his sling for a while. His current PROM is as follows: Flex: 110 deg,, ER: 35 deg. Previous Goals:  1. Patient will be independent and compliant with initial HEP. 2. Patient will report decreased pain levels to 0/10 in order to sleep through the night pain-free  3. Demonstrate improved R shoulder PROM to 120 deg flexion and 40 deg ER in order to improve ease with dressing     Prior Level/Current Level:  1) Prior Level: N/A   Current Level: Independent current HEP   Goal Met? yes  2) Prior Level: 2-3/10   Current Level: 1-2/10   Goal Met? yes  3) Prior Level: 60 degrees flexion, Neutral ER   Current Level: 110 deg Flex, 35 deg ER   Goal Met?  Progressing well    Problem List: pain affecting function, decrease ROM, decrease strength, decrease ADL/ functional abilitiies, decrease activity tolerance and decrease flexibility/ joint mobility   Treatment Plan may include any combination of the following: Therapeutic exercise, Therapeutic activities, Neuromuscular re-education, Physical agent/modality, Manual therapy, Patient education and Self Care training  Patient Goal(s) has been updated and includes:      Goals for this certification period include and are to be achieved in   4-8  weeks:  1. Patient will be independent and compliant with progressive HEP for R shoulder ROM/strength in order to maintain gains made with physical therapy. 2. Improve FOTO score from 28 to > or = 57 in order to indicate  improved ease with ADLs. 3. Demonstrate improved R shoulder strength to globally 4/5 in order to improve ease with recreational activity. ,     Frequency / Duration:   Patient to be seen   2   times per week for   4-8    weeks:    Assessments/Recommendations: Continue PT 2x/wk for 4-8 more weeks to continue progress towards all LTG's  If you have any questions/comments please contact us directly at [phone]. Thank you for allowing us to assist in the care of your patient. Therapist Signature: Savannah Brown PT Date: 7/0/8007   Certification Period:  Reporting Period: 1/6/22 to 4/6/22  12/15/21 to 1/6/22 Time: 8:42 AM   NOTE TO PHYSICIAN:  PLEASE COMPLETE THE ORDERS BELOW AND FAX TO   [facility] Physical Therapy: [phone]  If you are unable to process this request in 24 hours please contact our office: [phone]      ___ I have read the above report and request that my patient continue as recommended.   ___ I have read the above report and request that my patient continue therapy with the following changes/special instructions: ________________________________________________   ___ I have read the above report and request that my patient be discharged from therapy.      I certify that the above Physical Therapy Services are being furnished while the patient is under my care. I agree with the treatment plan and certify that this therapy is necessary. Physician Signature:        Date:       Time:        Joann Barker*  Please sign and return to [facility] or you may fax the signed copy to [phone]. Thank you.

## 2022-01-06 NOTE — PROGRESS NOTES
PHYSICAL THERAPY - DAILY TREATMENT NOTE     Patient Name: Queenie Sunshine        Date: 2022  : 1955   YES Patient  Verified  Visit #:     Insurance: Payor: Paty Stanton / Plan: [place] MEDICARE PART A & B / Product Type: Medicare /      In time: 102 Out time: 572   Total Treatment Time: 45     Medicare/BCBS Time Tracking (below)   Total Timed Codes (min):  45 1:1 Treatment Time:  35     TREATMENT AREA =  Right shoulder pain [M25.511]    SUBJECTIVE    Pain Level (on 0 to 10 scale):  1-2  / 10   Medication Changes/New allergies or changes in medical history, any new surgeries or procedures?     NO    If yes, update Summary List   Subjective Functional Status/Changes:  []  No changes reported       Functional improvements: \"It's ok, sore today\"  Functional impairments: Decreased ROM and strength affecting ADL's         OBJECTIVE  Modalities Rationale:     decrease inflammation and decrease pain to improve patient's ability to perform ADL's w/ less pain      min [] Estim, type/location:                                      []  att     []  unatt     []  w/US     []  w/ice    []  w/heat    min []  Mechanical Traction: type/lbs                   []  pro   []  sup   []  int   []  cont    []  before manual    []  after manual    min []  Ultrasound, settings/location:      min []  Iontophoresis w/ dexamethasone, location:                                               []  take home patch       []  in clinic   10 min [x]  Ice     []  Heat    location/position: Supine to R shoulder    min []  Vasopneumatic Device, press/temp:  If using vaso (only need to measure limb vaso being performed on)      pre-treatment girth :       post-treatment girth :       measured at (landmark location) :       min []  Other:    [x] Skin assessment post-treatment (if applicable):    [x]  intact    [x]  redness- no adverse reaction     []redness  adverse reaction:      20 min Therapeutic Exercise:  [x]  See flow sheet Rationale:      increase ROM and increase strength to improve the patients ability to prepare for AAROM and perform ADL's     15 min Manual Therapy: Technique:      [] S/DTM []IASTM [x]PROM [x] Passive Stretching   []manual TPR    []Jt manipulation:Gr I [] II []  III [] IV[] V[]  Treatment Area:     Rationale:      increase ROM to improve patient's ability to perform ADL's. The manual therapy interventions were performed at a separate and distinct time from the therapeutic activities interventions. Billed With/As:   [x] TE   [] TA   [] Neuro   [] Self Care Patient Education: [x] Review HEP    [] Progressed/Changed HEP based on:   [] positioning   [] body mechanics   [] transfers   [] heat/ice application    [] other:        Other Objective/Functional Measures:    See PN   Post Treatment Pain Level (on 0 to 10) scale:   1  / 10     ASSESSMENT    Assessment/Changes in Function:     See PN     [x]  See Progress Note/Recertification   Patient will continue to benefit from skilled PT services to modify and progress therapeutic interventions, address functional mobility deficits, address ROM deficits, address strength deficits, analyze and address soft tissue restrictions, analyze and cue movement patterns, analyze and modify body mechanics/ergonomics and assess and modify postural abnormalities to attain remaining goals.       Progress toward goals / Updated goals:    See PN     PLAN    [x]  Upgrade activities as tolerated YES Continue plan of care   []  Discharge due to :    []  Other:      Therapist: Rodriguez Talbot PT    Date: 1/6/2022 Time: 8:37 AM     Future Appointments   Date Time Provider Danny Cortes   1/6/2022 11:45 AM [facility]   1/11/2022 12:45 PM Mei Dhillon, PT [facility]   1/13/2022  2:15 PM Mei Dhillon, PT [facility]   1/18/2022 12:45 PM Mei Dhillon, PT [facility]   1/20/2022 11:15 AM [facility] PT [facility]   1/25/2022 10:30 AM Mei Dhillon, PT [facility] 1/27/2022 11:45 AM Parth Carrillo PT [facility]

## 2022-01-11 ENCOUNTER — HOSPITAL ENCOUNTER (OUTPATIENT)
Dept: PHYSICAL THERAPY | Age: 67
Discharge: HOME | End: 2022-01-11
Payer: MEDICARE

## 2022-01-11 PROCEDURE — 97140 MANUAL THERAPY 1/> REGIONS: CPT

## 2022-01-11 PROCEDURE — 97110 THERAPEUTIC EXERCISES: CPT

## 2022-01-11 NOTE — PROGRESS NOTES
PHYSICAL THERAPY - DAILY TREATMENT NOTE    Patient Name: Teto Solomon        Date: 2022  : 1955   YES Patient  Verified  Visit #:     Insurance: Payor: Darcy Garcia / Plan: [place] MEDICARE PART A & B / Product Type: Medicare /      In time:  Out time: 135   Total Treatment Time: 50     BCBS/Medicare Time Tracking (below)   Total Timed Codes (min):  40 1:1 Treatment Time:  40     TREATMENT AREA =  Right shoulder pain [M25.511]    SUBJECTIVE  Pain Level (on 0 to 10 scale):  1-2  / 10   Medication Changes/New allergies or changes in medical history, any new surgeries or procedures? NO    If yes, update Summary List   Subjective Functional Status/Changes:  []  No changes reported     I have been a little achy from being out of the sling but its nothing uncomfortable          Modalities Rationale:     decrease edema, decrease inflammation, decrease pain and increase tissue extensibility to improve patient's ability to perform pain-free ADLs.     min [] Estim, type/location:                                      []  att     []  unatt     []  w/US     []  w/ice    []  w/heat    min []  Mechanical Traction: type/lbs                   []  pro   []  sup   []  int   []  cont    []  before manual    []  after manual    min []  Ultrasound, settings/location:      min []  Iontophoresis w/ dexamethasone, location:                                               []  take home patch       []  in clinic   10 min [x]  Ice     []  Heat    location/position: Seated to R shoulder    min []  Vasopneumatic Device, press/temp:    If using vaso (only need to measure limb vaso being performed on)      pre-treatment girth :       post-treatment girth :       measured at (landmark location) :      min []  Other:    [x] Skin assessment post-treatment (if applicable):    [x]  intact    [x]  redness- no adverse reaction                  []redness  adverse reaction:        25 min Therapeutic Exercise:  [x]  See flow sheet   Rationale:      increase ROM, increase strength, improve coordination and increase proprioception to improve the patients ability to perform unlimited ADLs. 15 min Manual Therapy: PROM into flexion and ER in the scapular plane with oscillatoins to promote muscle relaxation   Rationale:      decrease pain, increase ROM, increase tissue extensibility and decrease edema  to improve patient's ability to improve PROM per protocol  The manual therapy interventions were performed at a separate and distinct time from the therapeutic activities interventions. Billed With/As:   [x] TE   [] TA   [] Neuro   [] Self Care Patient Education: [x] Review HEP    [] Progressed/Changed HEP based on:   [] positioning   [] body mechanics   [] transfers   [] heat/ice application    [] other:      Other Objective/Functional Measures: Add Isometric IR/ER at wall     Post Treatment Pain Level (on 0 to 10) scale:   1  / 10     ASSESSMENT  Assessment/Changes in Function:     Improved tolerance to manual stretches with less muscle guarding. Educated pt about post op protocol and weaning from sling; reports he is still sleeping with it on.      []  See Progress Note/Recertification   Patient will continue to benefit from skilled PT services to modify and progress therapeutic interventions, address functional mobility deficits, address ROM deficits, address strength deficits, analyze and address soft tissue restrictions, analyze and cue movement patterns, analyze and modify body mechanics/ergonomics and assess and modify postural abnormalities to attain remaining goals. Progress toward goals / Updated goals:    Progressing towards LTG 3.       PLAN  [x]  Upgrade activities as tolerated YES Continue plan of care   []  Discharge due to :    []  Other:      Therapist: Jaycee Pendleton DPT    Date: 1/11/2022 Time: 12:51 PM     Future Appointments   Date Time Provider Danny Cortes   1/13/2022  2:15 PM Matt Saenz, PT [facility]   1/18/2022 12:45 PM Atiya Aguila, PT [facility]   1/20/2022 11:15 AM [facility] PT [place] [facility]   1/25/2022 10:30 AM Atiya Aguila, PT [facility]   1/27/2022 11:45 AM Atiya Aguila, PT [facility]

## 2022-01-13 ENCOUNTER — HOSPITAL ENCOUNTER (OUTPATIENT)
Dept: PHYSICAL THERAPY | Age: 67
Discharge: HOME | End: 2022-01-13
Payer: MEDICARE

## 2022-01-13 PROCEDURE — 97140 MANUAL THERAPY 1/> REGIONS: CPT

## 2022-01-13 PROCEDURE — 97110 THERAPEUTIC EXERCISES: CPT

## 2022-01-13 NOTE — PROGRESS NOTES
PHYSICAL THERAPY - DAILY TREATMENT NOTE    Patient Name: Nat Tracy        Date: 2022  : 1955   YES Patient  Verified  Visit #:   10   of   12  Insurance: Payor: Jad Wolf / Plan: [place] MEDICARE PART A & B / Product Type: Medicare /      In time: 215 Out time: 305   Total Treatment Time: 50     BCBS/Medicare Time Tracking (below)   Total Timed Codes (min):  40 1:1 Treatment Time:  40     TREATMENT AREA =  Right shoulder pain [M25.511]    SUBJECTIVE  Pain Level (on 0 to 10 scale):  1-2   10   Medication Changes/New allergies or changes in medical history, any new surgeries or procedures? NO    If yes, update Summary List   Subjective Functional Status/Changes:  []  No changes reported     I put the sling on yesterday because I was a little sore. Im achy pretty consistently since taking the sling off. Modalities Rationale:     decrease edema, decrease inflammation, decrease pain and increase tissue extensibility to improve patient's ability to perform pain-free ADLs.     min [] Estim, type/location:                                      []  att     []  unatt     []  w/US     []  w/ice    []  w/heat    min []  Mechanical Traction: type/lbs                   []  pro   []  sup   []  int   []  cont    []  before manual    []  after manual    min []  Ultrasound, settings/location:      min []  Iontophoresis w/ dexamethasone, location:                                               []  take home patch       []  in clinic   10 min [x]  Ice     []  Heat    location/position: Seated to R shoulder    min []  Vasopneumatic Device, press/temp:    If using vaso (only need to measure limb vaso being performed on)      pre-treatment girth :       post-treatment girth :       measured at (landmark location) :      min []  Other:    [x] Skin assessment post-treatment (if applicable):    [x]  intact    [x]  redness- no adverse reaction                  []redness  adverse reaction:        30 min Therapeutic Exercise:  [x]  See flow sheet   Rationale:      increase ROM, increase strength, improve coordination and increase proprioception to improve the patients ability to perform unlimited ADLs. 10 min Manual Therapy: PROM into flexion and ER in the scapular plane with oscillatoins to promote muscle relaxation   Rationale:      decrease pain, increase ROM, increase tissue extensibility and decrease edema  to improve patient's ability to improve PROM per protocol  The manual therapy interventions were performed at a separate and distinct time from the therapeutic activities interventions. Billed With/As:   [x] TE   [] TA   [] Neuro   [] Self Care Patient Education: [x] Review HEP    [] Progressed/Changed HEP based on:   [] positioning   [] body mechanics   [] transfers   [] heat/ice application    [] other:      Other Objective/Functional Measures: Added dowel flexion and ER     Post Treatment Pain Level (on 0 to 10) scale:   1  / 10     ASSESSMENT  Assessment/Changes in Function:     Pt fatigued quickly with AAROM exercises with visible juddering/shaking at end ranges. Good tolerance to isometrics; plan to add more exercises next visit after assessing symptoms after beginning phase II of protocol. []  See Progress Note/Recertification   Patient will continue to benefit from skilled PT services to modify and progress therapeutic interventions, address functional mobility deficits, address ROM deficits, address strength deficits, analyze and address soft tissue restrictions, analyze and cue movement patterns, analyze and modify body mechanics/ergonomics and assess and modify postural abnormalities to attain remaining goals. Progress toward goals / Updated goals:    Progressing towards LTG 3.       PLAN  [x]  Upgrade activities as tolerated YES Continue plan of care   []  Discharge due to :    []  Other:      Therapist: Joseph Flynn DPT    Date: 1/13/2022 Time: 12:51 PM     Future Appointments   Date Time Provider Department Center   1/18/2022 12:45 PM [facility]   1/20/2022 11:15 AM [facility] PT [place] [facility]   1/25/2022 10:30 AM Atiya Aguila, PT [facility]   1/27/2022 11:45 AM Atiya Aguila, PT [facility]

## 2022-01-18 ENCOUNTER — HOSPITAL ENCOUNTER (OUTPATIENT)
Dept: PHYSICAL THERAPY | Age: 67
Discharge: HOME | End: 2022-01-18
Payer: MEDICARE

## 2022-01-18 PROCEDURE — 97110 THERAPEUTIC EXERCISES: CPT

## 2022-01-18 PROCEDURE — 97140 MANUAL THERAPY 1/> REGIONS: CPT

## 2022-01-18 NOTE — PROGRESS NOTES
PHYSICAL THERAPY - DAILY TREATMENT NOTE    Patient Name: Parul Eid        Date: 2022  : 1955   YES Patient  Verified  Visit #:     Insurance: Payor: Shirley Counter / Plan: [place] MEDICARE PART A & B / Product Type: Medicare /      In time: 3369 Out time: 1210   Total Treatment Time: 55     BCBS/Medicare Time Tracking (below)   Total Timed Codes (min):  45 1:1 Treatment Time:  45     TREATMENT AREA =  Right shoulder pain [M25.511]    SUBJECTIVE  Pain Level (on 0 to 10 scale):  1-2   10   Medication Changes/New allergies or changes in medical history, any new surgeries or procedures? NO    If yes, update Summary List   Subjective Functional Status/Changes:  []  No changes reported     I have been sore and achy since adding new stuff. Modalities Rationale:     decrease edema, decrease inflammation, decrease pain and increase tissue extensibility to improve patient's ability to perform pain-free ADLs.     min [] Estim, type/location:                                      []  att     []  unatt     []  w/US     []  w/ice    []  w/heat    min []  Mechanical Traction: type/lbs                   []  pro   []  sup   []  int   []  cont    []  before manual    []  after manual    min []  Ultrasound, settings/location:      min []  Iontophoresis w/ dexamethasone, location:                                               []  take home patch       []  in clinic   10 min [x]  Ice     []  Heat    location/position: Seated to R shoulder    min []  Vasopneumatic Device, press/temp:    If using vaso (only need to measure limb vaso being performed on)      pre-treatment girth :       post-treatment girth :       measured at (landmark location) :      min []  Other:    [x] Skin assessment post-treatment (if applicable):    [x]  intact    [x]  redness- no adverse reaction                  []redness  adverse reaction:        35 min Therapeutic Exercise:  [x]  See flow sheet   Rationale:      increase ROM, increase strength, improve coordination and increase proprioception to improve the patients ability to perform unlimited ADLs. 10 min Manual Therapy: PROM into flexion and ER in the scapular plane with oscillatoins to promote muscle relaxation   Rationale:      decrease pain, increase ROM, increase tissue extensibility and decrease edema  to improve patient's ability to improve PROM per protocol  The manual therapy interventions were performed at a separate and distinct time from the therapeutic activities interventions. Billed With/As:   [x] TE   [] TA   [] Neuro   [] Self Care Patient Education: [x] Review HEP    [] Progressed/Changed HEP based on:   [] positioning   [] body mechanics   [] transfers   [] heat/ice application    [] other:      Other Objective/Functional Measures: Added post capsule stretch, prone row and pulleys      Post Treatment Pain Level (on 0 to 10) scale:   1  / 10     ASSESSMENT  Assessment/Changes in Function:     Pt continues to report fatigue during all AAROM exercises and requires constant cues to prevent UT compensations and excessive scapular protraction. Continues to show difficulty relaxing during manual treatment; unable to reach similar range with manual stretches compared to during self-stretches. []  See Progress Note/Recertification   Patient will continue to benefit from skilled PT services to modify and progress therapeutic interventions, address functional mobility deficits, address ROM deficits, address strength deficits, analyze and address soft tissue restrictions, analyze and cue movement patterns, analyze and modify body mechanics/ergonomics and assess and modify postural abnormalities to attain remaining goals. Progress toward goals / Updated goals:    Progressing towards LTG 3.       PLAN  [x]  Upgrade activities as tolerated YES Continue plan of care   []  Discharge due to :    []  Other:      Therapist: Leslie Bruno DPT    Date: 1/18/2022 Time: 12:51 PM     Future Appointments   Date Time Provider Danny Cortes   1/20/2022 11:15 AM [facility] PT [place] [facility]   1/25/2022 10:30 AM Akilah Nayak, PT [facility]   1/27/2022 11:45 AM Akilah Nayak PT [facility]

## 2022-01-20 ENCOUNTER — HOSPITAL ENCOUNTER (OUTPATIENT)
Dept: PHYSICAL THERAPY | Age: 67
Discharge: HOME | End: 2022-01-20
Payer: MEDICARE

## 2022-01-20 PROCEDURE — 97140 MANUAL THERAPY 1/> REGIONS: CPT | Performed by: PHYSICAL THERAPIST

## 2022-01-20 PROCEDURE — 97110 THERAPEUTIC EXERCISES: CPT | Performed by: PHYSICAL THERAPIST

## 2022-01-20 NOTE — PROGRESS NOTES
PHYSICAL THERAPY - DAILY TREATMENT NOTE    Patient Name: Nixon Valdez        Date: 2022  : 1955   YES Patient  Verified  Visit #:     Insurance: Payor: David Guillens / Plan: [place] MED[place] & B / Product Type: Medicare /      In time: 3746X Out time: 1220pm   Total Treatment Time: 65     BCBS/Medicare Time Tracking (below)   Total Timed Codes (min):  55 1:1 Treatment Time:  43     TREATMENT AREA =  Right shoulder pain [M25.511]    SUBJECTIVE  Pain Level (on 0 to 10 scale): 1/ 10   Medication Changes/New allergies or changes in medical history, any new surgeries or procedures? NO    If yes, update Summary List   Subjective Functional Status/Changes:  []  No changes reported     My next MD appt is in ~3 weeks. Modalities Rationale:     decrease edema, decrease inflammation, decrease pain and increase tissue extensibility to improve patient's ability to perform pain-free ADLs.     min [] Estim, type/location:                                      []  att     []  unatt     []  w/US     []  w/ice    []  w/heat    min []  Mechanical Traction: type/lbs                   []  pro   []  sup   []  int   []  cont    []  before manual    []  after manual    min []  Ultrasound, settings/location:      min []  Iontophoresis w/ dexamethasone, location:                                               []  take home patch       []  in clinic   10 min [x]  Ice     []  Heat    location/position: Seated to R shoulder    min []  Vasopneumatic Device, press/temp:    If using vaso (only need to measure limb vaso being performed on)      pre-treatment girth :       post-treatment girth :       measured at (landmark location) :      min []  Other:    [x] Skin assessment post-treatment (if applicable):    [x]  intact    [x]  redness- no adverse reaction                  []redness  adverse reaction:        35/20 min Therapeutic Exercise:  [x]  See flow sheet   Rationale:      increase ROM, increase strength, improve coordination and increase proprioception to improve the patients ability to perform unlimited ADLs. 23 min Manual Therapy: PROM into flexion and ER in the scapular plane with oscillatoins to promote muscle relaxation, forearm and elbow stretches, mild scaption PROM. Rationale:      decrease pain, increase ROM, increase tissue extensibility and decrease edema  to improve patient's ability to improve PROM per protocol  The manual therapy interventions were performed at a separate and distinct time from the therapeutic activities interventions. Billed With/As:   [x] TE   [] TA   [] Neuro   [] Self Care Patient Education: [x] Review HEP    [] Progressed/Changed HEP based on:   [] positioning   [] body mechanics   [] transfers   [] heat/ice application    [] other:      Other Objective/Functional Measures: Added SL ER and ABD today. Pt requires min A with ABD, and cues for elbow tuck with ER. Added MT stretch to R forearm and elbow, mild scar massage, and elbow extension with arm tucked to side. Post Treatment Pain Level (on 0 to 10) scale:   1 / 10     ASSESSMENT  Assessment/Changes in Function:     Pt presents with weakness in SL Abd and requires min A for performance of new therex. Pt has some tightness in biceps and palpable knot in lower biceps. Pt did well with new therex for elbow. Pt educated in mild S for forearm. []  See Progress Note/Recertification   Patient will continue to benefit from skilled PT services to modify and progress therapeutic interventions, address functional mobility deficits, address ROM deficits, address strength deficits, analyze and address soft tissue restrictions, analyze and cue movement patterns, analyze and modify body mechanics/ergonomics and assess and modify postural abnormalities to attain remaining goals.    Progress toward goals / Updated goals:  Progressing towards LTG 3.   1. Patient will be independent and compliant with progressive HEP for R shoulder ROM/strength in order to maintain gains made with physical therapy. 2. Improve FOTO score from 28 to > or = 57 in order to indicate  improved ease with ADLs. 3. Demonstrate improved R shoulder strength to globally 4/5 in order to improve ease with recreational activity. ,        PLAN  [x]  Upgrade activities as tolerated YES Continue plan of care   []  Discharge due to :    []  Other:      Therapist: Pati MULLER    Date: 1/20/2022 Time: 12:51 PM     Future Appointments   Date Time Provider Danny Cortes   1/20/2022 11:15 AM [facility] PT [place] [facility]   1/25/2022 10:30 AM Jenness [place], PT [facility]   1/27/2022 11:45 AM Jenness [place], PT [facility]   2/1/2022 12:45 PM Jenness [place], PT [facility]   2/3/2022 11:45 AM Jenness [place], PT [facility]   2/8/2022 11:15 AM Jenness [place], PT [facility]   2/10/2022 11:45 AM Jenness [place], PT [facility]   2/15/2022  1:30 PM Jenness [place], PT [facility]   2/17/2022 11:45 AM Jenness [place], PT [facility]   2/22/2022 11:15 AM Jenness [place], PT [facility]   2/24/2022 11:45 AM Jenness [place], PT [facility]

## 2022-01-25 ENCOUNTER — HOSPITAL ENCOUNTER (OUTPATIENT)
Dept: PHYSICAL THERAPY | Age: 67
Discharge: HOME | End: 2022-01-25
Payer: MEDICARE

## 2022-01-25 PROCEDURE — 97110 THERAPEUTIC EXERCISES: CPT

## 2022-01-25 PROCEDURE — 97140 MANUAL THERAPY 1/> REGIONS: CPT

## 2022-01-25 NOTE — PROGRESS NOTES
PHYSICAL THERAPY - DAILY TREATMENT NOTE    Patient Name: Corina Westbrook        Date: 2022  : 1955   YES Patient  Verified  Visit #:     Insurance: Payor: Robin Javed / Plan: [place] MEDICARE PART A & B / Product Type: Medicare /      In time: 312 Out time:    Total Treatment Time: 55     BCBS/Medicare Time Tracking (below)   Total Timed Codes (min):  45 1:1 Treatment Time:  45     TREATMENT AREA =  Right shoulder pain [M25.511]    SUBJECTIVE  Pain Level (on 0 to 10 scale):  1-2  / 10   Medication Changes/New allergies or changes in medical history, any new surgeries or procedures? NO    If yes, update Summary List   Subjective Functional Status/Changes:  []  No changes reported     I have begun trying to  light items around the house but I find it harder to put them down. Modalities Rationale:     decrease edema, decrease inflammation and decrease pain to improve patient's ability to perform pain-free ADLs.     min [] Estim, type/location:                                      []  att     []  unatt     []  w/US     []  w/ice    []  w/heat    min []  Mechanical Traction: type/lbs                   []  pro   []  sup   []  int   []  cont    []  before manual    []  after manual    min []  Ultrasound, settings/location:      min []  Iontophoresis w/ dexamethasone, location:                                               []  take home patch       []  in clinic   10 min [x]  Ice     []  Heat    location/position: R shoulder supine    min []  Vasopneumatic Device, press/temp:    If using vaso (only need to measure limb vaso being performed on)      pre-treatment girth :       post-treatment girth :       measured at (landmark location) :      min []  Other:    [x] Skin assessment post-treatment (if applicable):    [x]  intact    [x]  redness- no adverse reaction                  []redness  adverse reaction:        35 min Therapeutic Exercise:  [x]  See flow sheet   Rationale: increase ROM and increase strength to improve the patients ability to perform unlimited ADLs. 10 min Manual Therapy: PROM into flexion, abd and ER at varying degrees of ABD   Rationale:      decrease pain, increase ROM and increase tissue extensibility to improve patient's ability to improve mobility  The manual therapy interventions were performed at a separate and distinct time from the therapeutic activities interventions. Billed With/As:   [x] TE   [] TA   [] Neuro   [] Self Care Patient Education: [x] Review HEP    [] Progressed/Changed HEP based on:   [] positioning   [] body mechanics   [] transfers   [] heat/ice application    [] other:      Other Objective/Functional Measures: Added SL abd, supine protraction/circles and  finger ladder     Post Treatment Pain Level (on 0 to 10) scale:   1  / 10     ASSESSMENT  Assessment/Changes in Function:     Good tolerance to new exercises but pt required cues to prevent UT compensations and allow for appropriate/true flexion ROM. Limited range noted into ER both passively and actively; updated HEP to include AAROM and gentle AROM exercises. []  See Progress Note/Recertification   Patient will continue to benefit from skilled PT services to modify and progress therapeutic interventions, address functional mobility deficits, address ROM deficits, address strength deficits, analyze and address soft tissue restrictions, analyze and cue movement patterns, analyze and modify body mechanics/ergonomics and assess and modify postural abnormalities to attain remaining goals. Progress toward goals / Updated goals:    Progressing towards LTG 3.       PLAN  [x]  Upgrade activities as tolerated YES Continue plan of care   []  Discharge due to :    []  Other:      Therapist: Yenny Grande DPT    Date: 1/25/2022 Time: 1:57 PM     Future Appointments   Date Time Provider Danny Cortes   1/27/2022 11:45 AM Natan Taveras, PT [facility] [place] [facility]   2/1/2022 12:45 PM Kenneth Castellanos, PT [facility]   2/3/2022 11:45 AM Kenneth Castellanos, PT [facility]   2/8/2022 11:15 AM Kenneth Castellanos, PT [facility]   2/10/2022 11:45 AM Kenneth Castellanos, PT [facility]   2/15/2022  1:30 PM Kenneth Castellanos, PT [facility]   2/17/2022 11:45 AM Kenneth Castellanos, PT [facility]   2/22/2022 11:15 AM Kenneth Castellanos, PT [facility]   2/24/2022 11:45 AM Kenneth Castellanos, PT [facility]

## 2022-01-27 ENCOUNTER — HOSPITAL ENCOUNTER (OUTPATIENT)
Dept: PHYSICAL THERAPY | Age: 67
Discharge: HOME | End: 2022-01-27
Payer: MEDICARE

## 2022-01-27 PROCEDURE — 97140 MANUAL THERAPY 1/> REGIONS: CPT

## 2022-01-27 PROCEDURE — 97110 THERAPEUTIC EXERCISES: CPT

## 2022-01-27 NOTE — PROGRESS NOTES
PHYSICAL THERAPY - DAILY TREATMENT NOTE    Patient Name: Tracy Cohen        Date: 2022  : 1955   YES Patient  Verified  Visit #:     Insurance: Payor: Alexyousif Javon / Plan: [place] MEDICARE PART A & B / Product Type: Medicare /      In time: 1150 Out time: 8   Total Treatment Time: 55     BCBS/Medicare Time Tracking (below)   Total Timed Codes (min):  45 1:1 Treatment Time:  45     TREATMENT AREA =  Right shoulder pain [M25.511]    SUBJECTIVE  Pain Level (on 0 to 10 scale):  1  / 10   Medication Changes/New allergies or changes in medical history, any new surgeries or procedures? NO    If yes, update Summary List   Subjective Functional Status/Changes:  []  No changes reported     I am still sleeping in my sling but I am going to try without it tonight. Modalities Rationale:     decrease edema, decrease inflammation and decrease pain to improve patient's ability to perform pain-free ADLs.     min [] Estim, type/location:                                      []  att     []  unatt     []  w/US     []  w/ice    []  w/heat    min []  Mechanical Traction: type/lbs                   []  pro   []  sup   []  int   []  cont    []  before manual    []  after manual    min []  Ultrasound, settings/location:      min []  Iontophoresis w/ dexamethasone, location:                                               []  take home patch       []  in clinic   10 min [x]  Ice     []  Heat    location/position: R shoulder supine    min []  Vasopneumatic Device, press/temp:    If using vaso (only need to measure limb vaso being performed on)      pre-treatment girth :       post-treatment girth :       measured at (landmark location) :      min []  Other:    [x] Skin assessment post-treatment (if applicable):    [x]  intact    [x]  redness- no adverse reaction                  []redness  adverse reaction:        35 min Therapeutic Exercise:  [x]  See flow sheet   Rationale:      increase ROM and increase strength to improve the patients ability to perform unlimited ADLs. 10 min Manual Therapy: PROM into flexion, abd and ER at varying degrees of ABD   Rationale:      decrease pain, increase ROM and increase tissue extensibility to improve patient's ability to improve mobility  The manual therapy interventions were performed at a separate and distinct time from the therapeutic activities interventions. Billed With/As:   [x] TE   [] TA   [] Neuro   [] Self Care Patient Education: [x] Review HEP    [] Progressed/Changed HEP based on:   [] positioning   [] body mechanics   [] transfers   [] heat/ice application    [] other:      Other Objective/Functional Measures:    Increased reps of multiple exercises     Post Treatment Pain Level (on 0 to 10) scale:   1  / 10     ASSESSMENT  Assessment/Changes in Function:     Pt requires frequent cues to prevent muscle guarding during manual and self-stretches. Pt experienced muscle twitching today throughout shoulder during gentle AROM exercises and required frequent rest breaks. No reports of pain throughout. []  See Progress Note/Recertification   Patient will continue to benefit from skilled PT services to modify and progress therapeutic interventions, address functional mobility deficits, address ROM deficits, address strength deficits, analyze and address soft tissue restrictions, analyze and cue movement patterns, analyze and modify body mechanics/ergonomics and assess and modify postural abnormalities to attain remaining goals. Progress toward goals / Updated goals:    Progressing towards LTG 3.       PLAN  [x]  Upgrade activities as tolerated YES Continue plan of care   []  Discharge due to :    []  Other:      Therapist: Jose Goss DPT    Date: 1/27/2022 Time: 1:57 PM     Future Appointments   Date Time Provider Danny Cortes   2/1/2022  9:45 AM Theresa Oneal PT [facility]   2/3/2022 11:45 AM Theresa Oneal PT [facility]   2/8/2022 11:15 AM Shaun Mullins, PT [facility]   2/10/2022 11:45 AM Shaun Mullins, PT [facility]   2/15/2022  1:30 PM Shaun Mullins, PT [facility]   2/17/2022 11:45 AM Shaun Mullins, PT [facility]   2/22/2022 11:15 AM Shaun Mullins, PT [facility]   2/24/2022 11:45 AM Shaun Mullins, PT [facility]

## 2022-02-01 ENCOUNTER — HOSPITAL ENCOUNTER (OUTPATIENT)
Dept: PHYSICAL THERAPY | Age: 67
Discharge: HOME | End: 2022-02-01
Payer: MEDICARE

## 2022-02-01 PROCEDURE — 97140 MANUAL THERAPY 1/> REGIONS: CPT

## 2022-02-01 PROCEDURE — 97110 THERAPEUTIC EXERCISES: CPT

## 2022-02-01 NOTE — PROGRESS NOTES
PHYSICAL THERAPY - DAILY TREATMENT NOTE    Patient Name: Flo Mcintyre        Date: 2022  : 1955   YES Patient  Verified  Visit #:   15   of   25  Insurance: Payor: Zurdo Memory / Plan: [place] MEDICARE PART A & B / Product Type: Medicare /      In time: 940 Out time:    Total Treatment Time: 55     BCBS/Medicare Time Tracking (below)   Total Timed Codes (min):  45 1:1 Treatment Time:  45     TREATMENT AREA =  Right shoulder pain [M25.511]    SUBJECTIVE  Pain Level (on 0 to 10 scale):  1  / 10   Medication Changes/New allergies or changes in medical history, any new surgeries or procedures? NO    If yes, update Summary List   Subjective Functional Status/Changes:  []  No changes reported     Stiffness remains but pain is low and feels better with stretches          Modalities Rationale:     decrease edema, decrease inflammation and decrease pain to improve patient's ability to perform pain-free ADLs.     min [] Estim, type/location:                                      []  att     []  unatt     []  w/US     []  w/ice    []  w/heat    min []  Mechanical Traction: type/lbs                   []  pro   []  sup   []  int   []  cont    []  before manual    []  after manual    min []  Ultrasound, settings/location:      min []  Iontophoresis w/ dexamethasone, location:                                               []  take home patch       []  in clinic   10 min [x]  Ice     []  Heat    location/position: R shoulder supine    min []  Vasopneumatic Device, press/temp:    If using vaso (only need to measure limb vaso being performed on)      pre-treatment girth :       post-treatment girth :       measured at (landmark location) :      min []  Other:    [x] Skin assessment post-treatment (if applicable):    [x]  intact    [x]  redness- no adverse reaction                  []redness  adverse reaction:        30 min Therapeutic Exercise:  [x]  See flow sheet   Rationale:      increase ROM and increase strength to improve the patients ability to perform unlimited ADLs. 15 min Manual Therapy: PROM into flexion, abd and ER at varying degrees of ABD   Rationale:      decrease pain, increase ROM and increase tissue extensibility to improve patient's ability to improve mobility  The manual therapy interventions were performed at a separate and distinct time from the therapeutic activities interventions. Billed With/As:   [x] TE   [] TA   [] Neuro   [] Self Care Patient Education: [x] Review HEP    [] Progressed/Changed HEP based on:   [] positioning   [] body mechanics   [] transfers   [] heat/ice application    [] other:      Other Objective/Functional Measures:    TE per FS    Improved to approx 110 deg flexion and 20 deg ER during manual stretches   Post Treatment Pain Level (on 0 to 10) scale:   1  / 10     ASSESSMENT  Assessment/Changes in Function:     Continues to show high levels of muscle guarding throughout entire session, improvement in range following pendulums and palpation to guarded muscle but pt continues to guard when new range is met. Encouraged to resume all passive stretching at home to improve capsular mobility. []  See Progress Note/Recertification   Patient will continue to benefit from skilled PT services to modify and progress therapeutic interventions, address functional mobility deficits, address ROM deficits, address strength deficits, analyze and address soft tissue restrictions, analyze and cue movement patterns, analyze and modify body mechanics/ergonomics and assess and modify postural abnormalities to attain remaining goals. Progress toward goals / Updated goals:    Progressing towards LTG 3.       PLAN  [x]  Upgrade activities as tolerated YES Continue plan of care   []  Discharge due to :    []  Other:      Therapist: Lelo Wagoner DPT    Date: 2/1/2022 Time: 1:57 PM     Future Appointments   Date Time Provider Danny Cortes   2/3/2022 11:45 AM Braden Newton PT [facility]   2/8/2022 11:15 AM Brayden Maher, PT [facility]   2/10/2022 11:45 AM Brayden Maher, PT [facility]   2/15/2022  1:30 PM Brayden Maher, PT [facility]   2/17/2022 11:45 AM Brayden Maher, PT [facility]   2/22/2022 11:15 AM Brayden Maher, PT [facility]   2/24/2022 11:45 AM Brayden Maher, PT [facility]

## 2022-02-03 ENCOUNTER — HOSPITAL ENCOUNTER (OUTPATIENT)
Dept: PHYSICAL THERAPY | Age: 67
Discharge: HOME | End: 2022-02-03
Payer: MEDICARE

## 2022-02-03 PROCEDURE — 97140 MANUAL THERAPY 1/> REGIONS: CPT

## 2022-02-03 PROCEDURE — 97110 THERAPEUTIC EXERCISES: CPT

## 2022-02-03 NOTE — PROGRESS NOTES
PHYSICAL THERAPY - DAILY TREATMENT NOTE    Patient Name: Teto Solomon        Date: 2/3/2022  : 1955   YES Patient  Verified  Visit #:     Insurance: Payor: Darcy Garcia / Plan: [place] MED[place] PART [place] & B / Product Type: Medicare /      In time: 7052 Out time: 88   Total Treatment Time: 55     BCBS/Medicare Time Tracking (below)   Total Timed Codes (min):  45 1:1 Treatment Time:  45     TREATMENT AREA =  Right shoulder pain [M25.511]    SUBJECTIVE  Pain Level (on 0 to 10 scale):  1  / 10   Medication Changes/New allergies or changes in medical history, any new surgeries or procedures? NO    If yes, update Summary List   Subjective Functional Status/Changes:  []  No changes reported     I only have pain at the end of my strethces, it aches at night. Modalities Rationale:     decrease edema, decrease inflammation and decrease pain to improve patient's ability to perform pain-free ADLs.     min [] Estim, type/location:                                      []  att     []  unatt     []  w/US     []  w/ice    []  w/heat    min []  Mechanical Traction: type/lbs                   []  pro   []  sup   []  int   []  cont    []  before manual    []  after manual    min []  Ultrasound, settings/location:      min []  Iontophoresis w/ dexamethasone, location:                                               []  take home patch       []  in clinic   10 min [x]  Ice     []  Heat    location/position: R shoulder supine    min []  Vasopneumatic Device, press/temp:    If using vaso (only need to measure limb vaso being performed on)      pre-treatment girth :       post-treatment girth :       measured at (landmark location) :      min []  Other:    [x] Skin assessment post-treatment (if applicable):    [x]  intact    [x]  redness- no adverse reaction                  []redness  adverse reaction:        30 min Therapeutic Exercise:  [x]  See flow sheet   Rationale:      increase ROM and increase strength to improve the patients ability to perform unlimited ADLs. 15 min Manual Therapy: PROM into flexion, abd and ER at varying degrees of ABD; compression to R shoulder prior to manual therapy to decrease muscle twitching/spasm; maintained throughout flexion ROM    Rationale:      decrease pain, increase ROM and increase tissue extensibility to improve patient's ability to improve mobility  The manual therapy interventions were performed at a separate and distinct time from the therapeutic activities interventions. Billed With/As:   [x] TE   [] TA   [] Neuro   [] Self Care Patient Education: [x] Review HEP    [] Progressed/Changed HEP based on:   [] positioning   [] body mechanics   [] transfers   [] heat/ice application    [] other:      Other Objective/Functional Measures:    TE per FS- encouraged pt to perform SL abd in scapular plane to avoid excessive shoulder extension    Achieved approx 120 deg flexion during manual stretches and pulleys   Post Treatment Pain Level (on 0 to 10) scale:   1  / 10     ASSESSMENT  Assessment/Changes in Function:     Improvement in range today with less muscle guarding noted during OH motion. Pt unable to tolerate AROM into elevation in supine today as recommended by protocol, will reassess NV to assess strength gains. []  See Progress Note/Recertification   Patient will continue to benefit from skilled PT services to modify and progress therapeutic interventions, address functional mobility deficits, address ROM deficits, address strength deficits, analyze and address soft tissue restrictions, analyze and cue movement patterns, analyze and modify body mechanics/ergonomics and assess and modify postural abnormalities to attain remaining goals. Progress toward goals / Updated goals:    Progressing towards LTG 3.       PLAN  [x]  Upgrade activities as tolerated YES Continue plan of care   []  Discharge due to :    []  Other:      Therapist: Joseph Rutledge DPT Date: 2/3/2022 Time: 1:57 PM     Future Appointments   Date Time Provider Danny Cortes   2/8/2022 11:15 AM Kenneth Castellanos, PT [facility]   2/10/2022 11:45 AM Kenneth Castellanos PT [facility]   2/15/2022  1:30 PM Kenneth Castellanos, PT [facility]   2/17/2022 11:45 AM Kenneth Castellanos PT [facility]   2/22/2022 11:15 AM Kenneth Castellanos PT [facility]   2/24/2022 11:45 AM Kenneth Castellanos, PT [facility]

## 2022-02-08 ENCOUNTER — HOSPITAL ENCOUNTER (OUTPATIENT)
Dept: PHYSICAL THERAPY | Age: 67
Discharge: HOME | End: 2022-02-08
Payer: MEDICARE

## 2022-02-08 PROCEDURE — 97110 THERAPEUTIC EXERCISES: CPT

## 2022-02-08 PROCEDURE — 97140 MANUAL THERAPY 1/> REGIONS: CPT

## 2022-02-08 NOTE — PROGRESS NOTES
PHYSICAL THERAPY - DAILY TREATMENT NOTE    Patient Name: Maeve Estes        Date: 2022  : 1955   YES Patient  Verified  Visit #:     Insurance: Payor: Aminta Lujan / Plan: [place] MEDICARE PART A & B / Product Type: Medicare /      In time: 5330 Out time: 7910   Total Treatment Time: 60     BCBS/Medicare Time Tracking (below)   Total Timed Codes (min):  50 1:1 Treatment Time:  45     TREATMENT AREA =  Right shoulder pain [M25.511]    SUBJECTIVE  Pain Level (on 0 to 10 scale):  1  / 10   Medication Changes/New allergies or changes in medical history, any new surgeries or procedures? NO    If yes, update Summary List   Subjective Functional Status/Changes:  []  No changes reported     Some of my exercises are getting easy. Modalities Rationale:     decrease edema, decrease inflammation and decrease pain to improve patient's ability to perform pain-free ADLs.     min [] Estim, type/location:                                      []  att     []  unatt     []  w/US     []  w/ice    []  w/heat    min []  Mechanical Traction: type/lbs                   []  pro   []  sup   []  int   []  cont    []  before manual    []  after manual    min []  Ultrasound, settings/location:      min []  Iontophoresis w/ dexamethasone, location:                                               []  take home patch       []  in clinic   10 min [x]  Ice     []  Heat    location/position: R shoulder supine    min []  Vasopneumatic Device, press/temp:    If using vaso (only need to measure limb vaso being performed on)      pre-treatment girth :       post-treatment girth :       measured at (landmark location) :      min []  Other:    [x] Skin assessment post-treatment (if applicable):    [x]  intact    [x]  redness- no adverse reaction                  []redness  adverse reaction:        35/30 min Therapeutic Exercise:  [x]  See flow sheet   Rationale:      increase ROM and increase strength to improve the patients ability to perform unlimited ADLs. 15 min Manual Therapy: PROM into flexion, abd and ER at varying degrees of ABD; compression to R shoulder prior to manual therapy to decrease muscle twitching/spasm; maintained throughout flexion ROM    Rationale:      decrease pain, increase ROM and increase tissue extensibility to improve patient's ability to improve mobility  The manual therapy interventions were performed at a separate and distinct time from the therapeutic activities interventions. Billed With/As:   [x] TE   [] TA   [] Neuro   [] Self Care Patient Education: [x] Review HEP    [] Progressed/Changed HEP based on:   [] positioning   [] body mechanics   [] transfers   [] heat/ice application    [] other:      Other Objective/Functional Measures: Added supine flexion punch AROM    Post Treatment Pain Level (on 0 to 10) scale:   1  / 10     ASSESSMENT  Assessment/Changes in Function:     See PN to MD     []  See Progress Note/Recertification   Patient will continue to benefit from skilled PT services to modify and progress therapeutic interventions, address functional mobility deficits, address ROM deficits, address strength deficits, analyze and address soft tissue restrictions, analyze and cue movement patterns, analyze and modify body mechanics/ergonomics and assess and modify postural abnormalities to attain remaining goals. Progress toward goals / Updated goals:    See PN for progress towards goals.       PLAN  [x]  Upgrade activities as tolerated YES Continue plan of care   []  Discharge due to :    []  Other:      Therapist: Syeda Davila DPT    Date: 2/8/2022 Time: 1:57 PM     Future Appointments   Date Time Provider Danny Cortes   2/10/2022 11:45 AM Mei Dhillon PT [facility]   2/15/2022  1:30 PM Mei Dhillon PT [facility]   2/17/2022 11:45 AM Mei Dhillon, PT [facility]   2/22/2022 11:15 AM Mei Dhillon PT [facility]   2/24/2022 11:45 AM Mei Dhillon, PT [facility]

## 2022-02-08 NOTE — PROGRESS NOTES
[address] [facility] PHYSICAL THERAPY AT [address] [place] [address]  Andrés Deleon [facility], [address] ,[address], [address]  Phone: [phone]  Fax: [phone]  PROGRESS NOTE  Patient Name: Gurdeep Helay : 1955   Treatment/Medical Diagnosis: Right shoulder pain [M25.511]   Referral Source: Dagmar Mckeon*     Date of Initial Visit: 12/15/2021 Attended Visits: 17 Missed Visits: 0     SUMMARY OF TREATMENT  PROM, AAROM and AROM of R shoulder to tolerance, stretching of R shoulder, gentle strengthening of deltoid,  RC musculature and scapular stabilizers, HEP prescription, patient education, manual therapy to improve mobility and CP for pain control/inflammation  CURRENT STATUS  Mr. Richard Yang has is 9 weeks s/p R reverse TSA on 2021. He has been making slow gains with PROM/AROM due to high levels of muscle guarding and involuntary tremors during self and manual stretches. Pt has shown greatest improvement in past 2 weeks and is tolerating initiation of AROM well with reports of soreness but no pain. Pt reports independence with light ADLs while maintaining weight restrictions per protocol. See below for objective measures:    Goal/Measure of Progress Goal Met? 1. Patient will be independent and compliant with progressive HEP for R shoulder ROM/strength in order to maintain gains made with physical therapy. Status at last Eval: Established  Current Status: independent with current program; compliant 5-6 days/week yes   2. Demonstrate improved R shoulder PROM to 120 deg flexion and 40 deg ER in order to improve ease with dressing   Status at last Eval: 110 deg Flex, 35 deg ER Current Status: 130 deg flex  35 deg ER in neutral Partially met   3. Improve FOTO score from 28 to > or = 57 in order to indicate  improved ease with ADLs. Status at last Eval: 28 Current Status: 57 yes     New Goals to be achieved in __4__  weeks:  1.   Patient will be independent and compliant with progressive HEP for R shoulder ROM/strength in order to maintain gains made with physical therapy. 2.  Report ability to resume light ADLs with pain remaining below 2/10 in order to improve functional mobility   3. Demonstrate improved R shoulder AROM to 120 deg flex and 90 deg Abd in order to improve reaching and grasping demands. 4.  Demonstrate improved R shoulder strength to globally 4/5 in order to improve ease with recreational activity. RECOMMENDATIONS  Continue with skilled PT services 2x/week for 4 more weeks in order to resume light recreational activity without pain. Thank you! If you have any questions/comments please contact us directly at [phone]. Thank you for allowing us to assist in the care of your patient. Therapist Signature: Lona Singletary DPT Date: 2/8/2022   Reporting period: 1/6/2022-2/8/2022 Time: 11:26 AM   NOTE TO PHYSICIAN:  PLEASE COMPLETE THE ORDERS BELOW AND FAX TO   [facility] [facility]: [phone]. If you are unable to process this request in 24 hours please contact our office: [phone].    ___ I have read the above report and request that my patient continue as recommended.   ___ I have read the above report and request that my patient continue therapy with the following changes/special instructions:_________________________________________________________   ___ I have read the above report and request that my patient be discharged from therapy. I certify that the above Physical Therapy Services are being furnished while the patient is under my care. I agree with the treatment plan and certify that this therapy is necessary.   Physician Signature:_______________________________________________  Date:____________________________     Time: _______________________________________  Paula Robb

## 2022-02-10 ENCOUNTER — APPOINTMENT (OUTPATIENT)
Dept: PHYSICAL THERAPY | Age: 67
End: 2022-02-10
Payer: MEDICARE

## 2022-02-15 ENCOUNTER — HOSPITAL ENCOUNTER (OUTPATIENT)
Dept: PHYSICAL THERAPY | Age: 67
Discharge: HOME | End: 2022-02-15
Payer: MEDICARE

## 2022-02-15 PROCEDURE — 97140 MANUAL THERAPY 1/> REGIONS: CPT

## 2022-02-15 PROCEDURE — 97110 THERAPEUTIC EXERCISES: CPT

## 2022-02-15 NOTE — PROGRESS NOTES
PHYSICAL THERAPY - DAILY TREATMENT NOTE    Patient Name: Markie Wilder        Date: 2/15/2022  : 1955   YES Patient  Verified  Visit #:     Insurance: Payor: Aldo Jones / Plan: [place] MEDICARE PART A & B / Product Type: Medicare /      In time: 130 Out time: 235   Total Treatment Time: 60     BCBS/Medicare Time Tracking (below)   Total Timed Codes (min):  50 1:1 Treatment Time:  45     TREATMENT AREA =  Right shoulder pain [M25.511]    SUBJECTIVE  Pain Level (on 0 to 10 scale):  1  / 10   Medication Changes/New allergies or changes in medical history, any new surgeries or procedures? NO    If yes, update Summary List   Subjective Functional Status/Changes:  []  No changes reported     I have started using my R arm more for shaving, brushing my hair and washing my hands. Modalities Rationale:     decrease edema, decrease inflammation and decrease pain to improve patient's ability to perform pain-free ADLs.     min [] Estim, type/location:                                      []  att     []  unatt     []  w/US     []  w/ice    []  w/heat    min []  Mechanical Traction: type/lbs                   []  pro   []  sup   []  int   []  cont    []  before manual    []  after manual    min []  Ultrasound, settings/location:      min []  Iontophoresis w/ dexamethasone, location:                                               []  take home patch       []  in clinic   10 min [x]  Ice     []  Heat    location/position: R shoulder supine    min []  Vasopneumatic Device, press/temp:    If using vaso (only need to measure limb vaso being performed on)      pre-treatment girth :       post-treatment girth :       measured at (landmark location) :      min []  Other:    [x] Skin assessment post-treatment (if applicable):    [x]  intact    [x]  redness- no adverse reaction                  []redness  adverse reaction:        35/30 min Therapeutic Exercise:  [x]  See flow sheet   Rationale:      increase ROM and increase strength to improve the patients ability to perform unlimited ADLs. 15 min Manual Therapy: PROM into flexion, abd and ER at varying degrees of ABD; compression to R shoulder prior to manual therapy to decrease muscle twitching/spasm; maintained throughout flexion ROM    Rationale:      decrease pain, increase ROM and increase tissue extensibility to improve patient's ability to improve mobility  The manual therapy interventions were performed at a separate and distinct time from the therapeutic activities interventions. Billed With/As:   [x] TE   [] TA   [] Neuro   [] Self Care Patient Education: [x] Review HEP    [] Progressed/Changed HEP based on:   [] positioning   [] body mechanics   [] transfers   [] heat/ice application    [] other:      Other Objective/Functional Measures: Added 15 deg incline to supine flexion punch AROM     Added AAROM with pulleys   Post Treatment Pain Level (on 0 to 10) scale:   1  / 10     ASSESSMENT  Assessment/Changes in Function:     Improved tolerance to AROM today but muscle guarding and thoracic movement prevent correct form; max cues required throughout all seated/standing exercises. Good tolerance to incline flexion with fair ER recruitment. []  See Progress Note/Recertification   Patient will continue to benefit from skilled PT services to modify and progress therapeutic interventions, address functional mobility deficits, address ROM deficits, address strength deficits, analyze and address soft tissue restrictions, analyze and cue movement patterns, analyze and modify body mechanics/ergonomics and assess and modify postural abnormalities to attain remaining goals. Progress toward goals / Updated goals:    Progressing towards LTG 3.       PLAN  [x]  Upgrade activities as tolerated YES Continue plan of care   []  Discharge due to :    []  Other:      Therapist: Michelle Flynn DPT    Date: 2/15/2022 Time: 1:57 PM     Future Appointments   Date Time Provider Department Center   2/17/2022 11:45 AM Kenneth Castellanos, PT [facility]   2/22/2022 11:15 AM Kenneth Castellanos, PT [facility]   2/24/2022 11:45 AM Kenneth Castellanos, PT [facility]

## 2022-02-17 ENCOUNTER — HOSPITAL ENCOUNTER (OUTPATIENT)
Dept: PHYSICAL THERAPY | Age: 67
Discharge: HOME | End: 2022-02-17
Payer: MEDICARE

## 2022-02-17 PROCEDURE — 97110 THERAPEUTIC EXERCISES: CPT

## 2022-02-17 PROCEDURE — 97140 MANUAL THERAPY 1/> REGIONS: CPT

## 2022-02-17 NOTE — PROGRESS NOTES
PHYSICAL THERAPY - DAILY TREATMENT NOTE    Patient Name: Denae Arriola        Date: 2022  : 1955   YES Patient  Verified  Visit #:     Insurance: Payor: Christofer Ovalle / Plan: [place] MEDICARE PART A & B / Product Type: Medicare /      In time: 966 Out time:    Total Treatment Time: 60     BCBS/Medicare Time Tracking (below)   Total Timed Codes (min):  50 1:1 Treatment Time:  45     TREATMENT AREA =  Right shoulder pain [M25.511]    SUBJECTIVE  Pain Level (on 0 to 10 scale):  1  / 10   Medication Changes/New allergies or changes in medical history, any new surgeries or procedures? NO    If yes, update Summary List   Subjective Functional Status/Changes:  []  No changes reported     A little achy after last time but not bad, I cant reach light switches with my R arm. Modalities Rationale:     decrease edema, decrease inflammation and decrease pain to improve patient's ability to perform pain-free ADLs.     min [] Estim, type/location:                                      []  att     []  unatt     []  w/US     []  w/ice    []  w/heat    min []  Mechanical Traction: type/lbs                   []  pro   []  sup   []  int   []  cont    []  before manual    []  after manual    min []  Ultrasound, settings/location:      min []  Iontophoresis w/ dexamethasone, location:                                               []  take home patch       []  in clinic   10 min [x]  Ice     []  Heat    location/position: R shoulder supine    min []  Vasopneumatic Device, press/temp:    If using vaso (only need to measure limb vaso being performed on)      pre-treatment girth :       post-treatment girth :       measured at (landmark location) :      min []  Other:    [x] Skin assessment post-treatment (if applicable):    [x]  intact    [x]  redness- no adverse reaction                  []redness  adverse reaction:        35/30 min Therapeutic Exercise:  [x]  See flow sheet   Rationale:      increase ROM and increase strength to improve the patients ability to perform unlimited ADLs. 15 min Manual Therapy: PROM into flexion and ER at varying degrees of ABD; gentle grade 1 PA mobs to GHJ   Rationale:      decrease pain, increase ROM and increase tissue extensibility to improve patient's ability to improve mobility  The manual therapy interventions were performed at a separate and distinct time from the therapeutic activities interventions. Billed With/As:   [x] TE   [] TA   [] Neuro   [] Self Care Patient Education: [x] Review HEP    [] Progressed/Changed HEP based on:   [] positioning   [] body mechanics   [] transfers   [] heat/ice application    [] other:      Other Objective/Functional Measures: Added 1# to supine protraction and standing scaption in front of mirror    Increased reps with multiple exercises   Post Treatment Pain Level (on 0 to 10) scale:   1  / 10     ASSESSMENT  Assessment/Changes in Function:     Good tolerance to manual treatment today with less spasms noted, improved range noted passively. Used mirror during pulleys today for visual cues for scapular elevation and muscle guarding; improved form with cues with less reports of discomfort. []  See Progress Note/Recertification   Patient will continue to benefit from skilled PT services to modify and progress therapeutic interventions, address functional mobility deficits, address ROM deficits, address strength deficits, analyze and address soft tissue restrictions, analyze and cue movement patterns, analyze and modify body mechanics/ergonomics and assess and modify postural abnormalities to attain remaining goals. Progress toward goals / Updated goals:    Progressing towards LTG 3.       PLAN  [x]  Upgrade activities as tolerated YES Continue plan of care   []  Discharge due to :    []  Other:      Therapist: Leslie Bruno DPT    Date: 2/17/2022 Time: 1:57 PM     Future Appointments   Date Time Provider Danny Cortes 2/22/2022 11:15 AM Beryle Harvard, PT [facility]   2/24/2022 11:45 AM Beryle Harvard, PT [facility]

## 2022-02-22 ENCOUNTER — HOSPITAL ENCOUNTER (OUTPATIENT)
Dept: PHYSICAL THERAPY | Age: 67
Discharge: HOME | End: 2022-02-22
Payer: MEDICARE

## 2022-02-22 PROCEDURE — 97110 THERAPEUTIC EXERCISES: CPT

## 2022-02-22 PROCEDURE — 97140 MANUAL THERAPY 1/> REGIONS: CPT

## 2022-02-22 NOTE — PROGRESS NOTES
PHYSICAL THERAPY - DAILY TREATMENT NOTE    Patient Name: Randy Ventura        Date: 2022  : 1955   YES Patient  Verified  Visit #:     Insurance: Payor: Lynette Rushing / Plan: [place] MED[place] & B / Product Type: Medicare /      In time: 45 Out time: 534   Total Treatment Time: 60     BCBS/Medicare Time Tracking (below)   Total Timed Codes (min):  50 1:1 Treatment Time:  45     TREATMENT AREA =  Right shoulder pain [M25.511]    SUBJECTIVE  Pain Level (on 0 to 10 scale):  1  / 10   Medication Changes/New allergies or changes in medical history, any new surgeries or procedures? NO    If yes, update Summary List   Subjective Functional Status/Changes:  []  No changes reported     I am trying to use my arm a little more around the house; putting dishes away gives me the most difficulty. Modalities Rationale:     decrease edema, decrease inflammation and decrease pain to improve patient's ability to perform pain-free ADLs.     min [] Estim, type/location:                                      []  att     []  unatt     []  w/US     []  w/ice    []  w/heat    min []  Mechanical Traction: type/lbs                   []  pro   []  sup   []  int   []  cont    []  before manual    []  after manual    min []  Ultrasound, settings/location:      min []  Iontophoresis w/ dexamethasone, location:                                               []  take home patch       []  in clinic   10 min [x]  Ice     []  Heat    location/position: R shoulder supine    min []  Vasopneumatic Device, press/temp:    If using vaso (only need to measure limb vaso being performed on)      pre-treatment girth :       post-treatment girth :       measured at (landmark location) :      min []  Other:    [x] Skin assessment post-treatment (if applicable):    [x]  intact    [x]  redness- no adverse reaction                  []redness  adverse reaction:        35/30 min Therapeutic Exercise:  [x]  See flow sheet Rationale:      increase ROM and increase strength to improve the patients ability to perform unlimited ADLs. 15 min Manual Therapy: PROM into flexion and ER at varying degrees of ABD; gentle grade 1 PA mobs to GHJ   Rationale:      decrease pain, increase ROM and increase tissue extensibility to improve patient's ability to improve mobility  The manual therapy interventions were performed at a separate and distinct time from the therapeutic activities interventions. Billed With/As:   [x] TE   [] TA   [] Neuro   [] Self Care Patient Education: [x] Review HEP    [] Progressed/Changed HEP based on:   [] positioning   [] body mechanics   [] transfers   [] heat/ice application    [] other:      Other Objective/Functional Measures:    See PN   Post Treatment Pain Level (on 0 to 10) scale:   1  / 10     ASSESSMENT  Assessment/Changes in Function:     See PN     []  See Progress Note/Recertification   Patient will continue to benefit from skilled PT services to modify and progress therapeutic interventions, address functional mobility deficits, address ROM deficits, address strength deficits, analyze and address soft tissue restrictions, analyze and cue movement patterns, analyze and modify body mechanics/ergonomics and assess and modify postural abnormalities to attain remaining goals. Progress toward goals / Updated goals:    Progressing towards LTG 3.       PLAN  [x]  Upgrade activities as tolerated YES Continue plan of care   []  Discharge due to :    []  Other:      Therapist: Gem Mcghee DPT    Date: 2/22/2022 Time: 1:57 PM     Future Appointments   Date Time Provider Danny Cortes   2/24/2022 11:45 AM Rolando Wade PT [facility]

## 2022-02-22 NOTE — PROGRESS NOTES
[address] [facility] PHYSICAL THERAPY AT [address] [place] [address]  [address], [address] ,[id number], [address]  Phone: [phone]  Fax: [phone]  PROGRESS NOTE  Patient Name: Nyla Ortiz : 1955   Treatment/Medical Diagnosis: Right shoulder pain [M25.511]   Referral Source: Sulaiman Eden*     Date of Initial Visit: 12/15/2021 Attended Visits: 20 Missed Visits: 0     SUMMARY OF TREATMENT  PROM, AAROM and AROM of R shoulder to tolerance, stretching of R shoulder, gentle strengthening of deltoid,  RC musculature and scapular stabilizers, HEP prescription, patient education, manual therapy to improve mobility and CP for pain control/inflammation    CURRENT STATUS  Mr. Hayden Juarez is 11 weeks s/p R reverse TSA on 2021 and is making consistent gains with PT Rx. In the past month, pt has demonstrated improvement in tolerance to both self and manual stretches with significantly less muscle guarding. He has tolerated initiation of AROM well and reports improved ability to use RUE for light ADLs within post-op precautions. He is able to achieve approx 80-85 deg scaption AROM prior to UT compensations. Objective values are unchanged from last PN on 2022, but pt has gained independence with HEP and improved functional mobility. New Goals to be achieved in __4__  weeks:  1. Patient will be independent and compliant with progressive HEP for R shoulder ROM/strength in order to maintain gains made with physical therapy. 2.  Report ability to resume light ADLs with pain remaining below 2/10 in order to improve functional mobility   3. Demonstrate improved R shoulder AROM to 120 deg flex and 90 deg Abd in order to improve reaching and grasping demands.     4.  Demonstrate improved R shoulder strength to globally 4/5 in order to improve ease with recreational activity.        RECOMMENDATIONS  Continue with skilled PT services 1-2x/week for 3-4 weeks to meet above goals and improve functional independence. Thank you! If you have any questions/comments please contact us directly at [phone]. Thank you for allowing us to assist in the care of your patient. Therapist Signature: Ann Marie Baker DPT Date: 2/22/2022   Reporting period: 2/8/2022-2/22/2022 Time: 11:22 AM   NOTE TO PHYSICIAN:  PLEASE COMPLETE THE ORDERS BELOW AND FAX TO   [facility]: [phone]. If you are unable to process this request in 24 hours please contact our office: [phone].    ___ I have read the above report and request that my patient continue as recommended.   ___ I have read the above report and request that my patient continue therapy with the following changes/special instructions:_________________________________________________________   ___ I have read the above report and request that my patient be discharged from therapy. I certify that the above Physical Therapy Services are being furnished while the patient is under my care. I agree with the treatment plan and certify that this therapy is necessary.   Physician Signature:_______________________________________________  Date:____________________________     Time: _______________________________________  Estrella Arana

## 2022-02-24 ENCOUNTER — HOSPITAL ENCOUNTER (OUTPATIENT)
Dept: PHYSICAL THERAPY | Age: 67
Discharge: HOME | End: 2022-02-24
Payer: MEDICARE

## 2022-02-24 PROCEDURE — 97110 THERAPEUTIC EXERCISES: CPT

## 2022-02-24 PROCEDURE — 97140 MANUAL THERAPY 1/> REGIONS: CPT

## 2022-02-24 NOTE — PROGRESS NOTES
PHYSICAL THERAPY - DAILY TREATMENT NOTE    Patient Name: Dm Mccarty        Date: 2022  : 1955   YES Patient  Verified  Visit #:     Insurance: Payor: Macarena Lambert / Plan: [place] MEDICARE PART A & B / Product Type: Medicare /      In time: 1150 Out time: 434   Total Treatment Time: 55     BCBS/Medicare Time Tracking (below)   Total Timed Codes (min):  45 1:1 Treatment Time:  45     TREATMENT AREA =  Right shoulder pain [M25.511]    SUBJECTIVE  Pain Level (on 0 to 10 scale):  1  / 10   Medication Changes/New allergies or changes in medical history, any new surgeries or procedures? NO    If yes, update Summary List   Subjective Functional Status/Changes:  []  No changes reported     The doctor was happy with my progress and said to continue with therapy for a while until progressing to home program.       Modalities Rationale:     decrease edema, decrease inflammation and decrease pain to improve patient's ability to perform pain-free ADLs.     min [] Estim, type/location:                                      []  att     []  unatt     []  w/US     []  w/ice    []  w/heat    min []  Mechanical Traction: type/lbs                   []  pro   []  sup   []  int   []  cont    []  before manual    []  after manual    min []  Ultrasound, settings/location:      min []  Iontophoresis w/ dexamethasone, location:                                               []  take home patch       []  in clinic   10 min [x]  Ice     []  Heat    location/position: R shoulder supine    min []  Vasopneumatic Device, press/temp:    If using vaso (only need to measure limb vaso being performed on)      pre-treatment girth :       post-treatment girth :       measured at (landmark location) :      min []  Other:    [x] Skin assessment post-treatment (if applicable):    [x]  intact    [x]  redness- no adverse reaction                  []redness  adverse reaction:        30 min Therapeutic Exercise:  [x]  See flow sheet   Rationale:      increase ROM and increase strength to improve the patients ability to perform unlimited ADLs. 15 min Manual Therapy: PROM into flexion and ER at varying degrees of ABD; gentle grade 1 PA mobs to GHJ   Rationale:      decrease pain, increase ROM and increase tissue extensibility to improve patient's ability to improve mobility  The manual therapy interventions were performed at a separate and distinct time from the therapeutic activities interventions. Billed With/As:   [x] TE   [] TA   [] Neuro   [] Self Care Patient Education: [x] Review HEP    [] Progressed/Changed HEP based on:   [] positioning   [] body mechanics   [] transfers   [] heat/ice application    [] other:      Other Objective/Functional Measures: Added ball on wall rhythmic stabilization and wall slide flexion ROM    Added 1# to supine flexion punch at 40 deg elevation     Post Treatment Pain Level (on 0 to 10) scale:   1  / 10     ASSESSMENT  Assessment/Changes in Function:     Improvement with self and manual stretches. Continues to show difficulty maintaining neutral rotation during scaption and flexion but is improving with practice. []  See Progress Note/Recertification   Patient will continue to benefit from skilled PT services to modify and progress therapeutic interventions, address functional mobility deficits, address ROM deficits, address strength deficits, analyze and address soft tissue restrictions, analyze and cue movement patterns, analyze and modify body mechanics/ergonomics and assess and modify postural abnormalities to attain remaining goals. Progress toward goals / Updated goals:    Progressing towards LTG 3.       PLAN  [x]  Upgrade activities as tolerated YES Continue plan of care   []  Discharge due to :    []  Other:      Therapist: Gem Mcghee DPT    Date: 2/24/2022 Time: 1:57 PM     Future Appointments   Date Time Provider Danny Cortes   3/1/2022 11:15 AM Rolando Wade PT [facility]   3/3/2022 12:30 PM Akilah Nayak, PT [facility]   3/8/2022 11:15 AM Akilah Nayak, PT [facility] [place] [facility]   3/10/2022 11:45 AM Akilah Nayak, PT [facility] [place] [facility]   3/15/2022 11:15 AM Akilah Nayak, PT [facility] [place] [facility]   3/17/2022 11:45 AM Akilah Nayak, PT [facility] [place] [facility]   3/22/2022 11:15 AM Akilah Nayak, PT [facility] [place] [facility]   3/24/2022 11:45 AM Akilah Nayak, PT [facility] [place] [facility]   3/29/2022 11:15 AM Akilah Nayak, PT [facility] [place] [facility]   3/31/2022 11:45 AM Akilah Nayak, PT [facility]

## 2022-03-01 ENCOUNTER — HOSPITAL ENCOUNTER (OUTPATIENT)
Dept: PHYSICAL THERAPY | Age: 67
Discharge: HOME | End: 2022-03-01
Payer: MEDICARE

## 2022-03-01 PROCEDURE — 97110 THERAPEUTIC EXERCISES: CPT

## 2022-03-01 PROCEDURE — 97140 MANUAL THERAPY 1/> REGIONS: CPT

## 2022-03-01 NOTE — PROGRESS NOTES
PHYSICAL THERAPY - DAILY TREATMENT NOTE    Patient Name: Guzman Freedman        Date: 3/1/2022  : 1955   YES Patient  Verified  Visit #:     Insurance: Payor: Gee Nones / Plan: [place] MEDICARE PART A & B / Product Type: Medicare /      In time: 860 Out time: 615   Total Treatment Time: 55     BCBS/Medicare Time Tracking (below)   Total Timed Codes (min):  45 1:1 Treatment Time:  45     TREATMENT AREA =  Right shoulder pain [M25.511]    SUBJECTIVE  Pain Level (on 0 to 10 scale):  1  / 10   Medication Changes/New allergies or changes in medical history, any new surgeries or procedures? NO    If yes, update Summary List   Subjective Functional Status/Changes:  []  No changes reported     I was a little extra sore after the new exercises last time. I can finally get my hand to the top of my head. Modalities Rationale:     decrease edema, decrease inflammation and decrease pain to improve patient's ability to perform pain-free ADLs.     min [] Estim, type/location:                                      []  att     []  unatt     []  w/US     []  w/ice    []  w/heat    min []  Mechanical Traction: type/lbs                   []  pro   []  sup   []  int   []  cont    []  before manual    []  after manual    min []  Ultrasound, settings/location:      min []  Iontophoresis w/ dexamethasone, location:                                               []  take home patch       []  in clinic   10 min [x]  Ice     []  Heat    location/position: R shoulder supine    min []  Vasopneumatic Device, press/temp:    If using vaso (only need to measure limb vaso being performed on)      pre-treatment girth :       post-treatment girth :       measured at (landmark location) :      min []  Other:    [x] Skin assessment post-treatment (if applicable):    [x]  intact    [x]  redness- no adverse reaction                  []redness  adverse reaction:        30 min Therapeutic Exercise:  [x]  See flow sheet Rationale:      increase ROM and increase strength to improve the patients ability to perform unlimited ADLs. 15 min Manual Therapy: PROM into flexion and ER at varying degrees of ABD; gentle grade 1 PA mobs to GHJ   Rationale:      decrease pain, increase ROM and increase tissue extensibility to improve patient's ability to improve mobility  The manual therapy interventions were performed at a separate and distinct time from the therapeutic activities interventions. Billed With/As:   [x] TE   [] TA   [] Neuro   [] Self Care Patient Education: [x] Review HEP    [] Progressed/Changed HEP based on:   [] positioning   [] body mechanics   [] transfers   [] heat/ice application    [] other:      Other Objective/Functional Measures:    Inc to 4# for bicep curl; 2# for supine protraction     Able to reach approx 85-90 deg scaption ROM before UT compensations     Post Treatment Pain Level (on 0 to 10) scale:   1  / 10     ASSESSMENT  Assessment/Changes in Function:     Continues to show steady progress towards strength and ROM goals with fair/low levels of pain. []  See Progress Note/Recertification   Patient will continue to benefit from skilled PT services to modify and progress therapeutic interventions, address functional mobility deficits, address ROM deficits, address strength deficits, analyze and address soft tissue restrictions, analyze and cue movement patterns, analyze and modify body mechanics/ergonomics and assess and modify postural abnormalities to attain remaining goals. Progress toward goals / Updated goals:    Progressing towards LTG 3.       PLAN  [x]  Upgrade activities as tolerated YES Continue plan of care   []  Discharge due to :    []  Other:      Therapist: Chavo Hernandez DPT    Date: 3/1/2022 Time: 1:57 PM     Future Appointments   Date Time Provider Danny Cortes   3/3/2022 12:30 PM Bar Dash PT [facility]   3/8/2022 11:15 AM Bar Dash PT [facility]   3/10/2022 11:45 AM Tarah Martinez, PT [facility] [place] [facility]   3/15/2022 11:15 AM Tarah Martinez, PT [facility]   3/17/2022 11:45 AM Tarah Martinez, PT [facility]   3/22/2022 11:15 AM Tarah Martinez, PT [facility]   3/24/2022 11:45 AM Tarah Martinez, PT [facility]   3/29/2022 11:15 AM Tarah Martinez, PT [facility]   3/31/2022 11:45 AM Tarah Martinez, PT [facility]

## 2022-03-03 ENCOUNTER — HOSPITAL ENCOUNTER (OUTPATIENT)
Dept: PHYSICAL THERAPY | Age: 67
Discharge: HOME | End: 2022-03-03
Payer: MEDICARE

## 2022-03-03 PROCEDURE — 97140 MANUAL THERAPY 1/> REGIONS: CPT

## 2022-03-03 PROCEDURE — 97110 THERAPEUTIC EXERCISES: CPT

## 2022-03-03 NOTE — PROGRESS NOTES
PHYSICAL THERAPY - DAILY TREATMENT NOTE    Patient Name: Breana Chadwick        Date: 3/3/2022  : 1955   YES Patient  Verified  Visit #:     Insurance: Payor: Emiliano Ho / Plan: [place] MEDICARE PART A & B / Product Type: Medicare /      In time:  Out time: 130   Total Treatment Time: 55     BCBS/Medicare Time Tracking (below)   Total Timed Codes (min):  45 1:1 Treatment Time:  45     TREATMENT AREA =  Right shoulder pain [M25.511]    SUBJECTIVE  Pain Level (on 0 to 10 scale):  1  / 10   Medication Changes/New allergies or changes in medical history, any new surgeries or procedures? NO    If yes, update Summary List   Subjective Functional Status/Changes:  []  No changes reported     I feel stronger every day       Modalities Rationale:     decrease edema, decrease inflammation and decrease pain to improve patient's ability to perform pain-free ADLs.     min [] Estim, type/location:                                      []  att     []  unatt     []  w/US     []  w/ice    []  w/heat    min []  Mechanical Traction: type/lbs                   []  pro   []  sup   []  int   []  cont    []  before manual    []  after manual    min []  Ultrasound, settings/location:      min []  Iontophoresis w/ dexamethasone, location:                                               []  take home patch       []  in clinic   10 min [x]  Ice     []  Heat    location/position: R shoulder supine    min []  Vasopneumatic Device, press/temp:    If using vaso (only need to measure limb vaso being performed on)      pre-treatment girth :       post-treatment girth :       measured at (landmark location) :      min []  Other:    [x] Skin assessment post-treatment (if applicable):    [x]  intact    [x]  redness- no adverse reaction                  []redness  adverse reaction:        30 min Therapeutic Exercise:  [x]  See flow sheet   Rationale:      increase ROM and increase strength to improve the patients ability to perform unlimited ADLs. 15 min Manual Therapy: PROM into flexion and ER at varying degrees of ABD; gentle grade 1 PA mobs to GHJ   Rationale:      decrease pain, increase ROM and increase tissue extensibility to improve patient's ability to improve mobility  The manual therapy interventions were performed at a separate and distinct time from the therapeutic activities interventions. Billed With/As:   [x] TE   [] TA   [] Neuro   [] Self Care Patient Education: [x] Review HEP    [] Progressed/Changed HEP based on:   [] positioning   [] body mechanics   [] transfers   [] heat/ice application    [] other:      Other Objective/Functional Measures:    Increased reps with multiple exercises     Post Treatment Pain Level (on 0 to 10) scale:   1  / 10     ASSESSMENT  Assessment/Changes in Function:     Improvements in both AA/AROM although remains limited past 90 degress. Active motion improved in standing but pt fatigues quickly and requires rest breaks. []  See Progress Note/Recertification   Patient will continue to benefit from skilled PT services to modify and progress therapeutic interventions, address functional mobility deficits, address ROM deficits, address strength deficits, analyze and address soft tissue restrictions, analyze and cue movement patterns, analyze and modify body mechanics/ergonomics and assess and modify postural abnormalities to attain remaining goals. Progress toward goals / Updated goals:    Progressing towards LTG 3.       PLAN  [x]  Upgrade activities as tolerated YES Continue plan of care   []  Discharge due to :    []  Other:      Therapist: Chavo Hernandez DPT    Date: 3/3/2022 Time: 1:57 PM     Future Appointments   Date Time Provider Danny Cortes   3/8/2022 11:15 AM Bar Dash PT [facility]   3/10/2022 11:45 AM Bar Dash PT [facility]   3/15/2022 11:15 AM Bra Dash PT [facility]   3/17/2022 11:45 AM Bar Dash PT [facility]   3/22/2022 11:15 AM [facility]   3/24/2022 11:45 AM Espinoza Steele, PT [facility]   3/29/2022 11:15 AM Espinoza Steele, [facility]   3/31/2022 11:45 AM Espinoza Steele, PT [facility]

## 2022-03-08 ENCOUNTER — HOSPITAL ENCOUNTER (OUTPATIENT)
Dept: PHYSICAL THERAPY | Age: 67
Discharge: HOME | End: 2022-03-08
Payer: MEDICARE

## 2022-03-08 PROCEDURE — 97110 THERAPEUTIC EXERCISES: CPT

## 2022-03-08 PROCEDURE — 97140 MANUAL THERAPY 1/> REGIONS: CPT

## 2022-03-08 NOTE — PROGRESS NOTES
PHYSICAL THERAPY - DAILY TREATMENT NOTE    Patient Name: Kezia Gallego        Date: 3/8/2022  : 1955   YES Patient  Verified  Visit #:     Insurance: Payor: Naliniangela Dixon / Plan: [place] MED[place] PART [place] & B / Product Type: Medicare /      In time: 276 Out time: 60   Total Treatment Time: 55     BCBS/Medicare Time Tracking (below)   Total Timed Codes (min):  45 1:1 Treatment Time:  45     TREATMENT AREA =  Right shoulder pain [M25.511]    SUBJECTIVE  Pain Level (on 0 to 10 scale):  1  / 10   Medication Changes/New allergies or changes in medical history, any new surgeries or procedures? NO    If yes, update Summary List   Subjective Functional Status/Changes:  []  No changes reported     I was able to cook dinner independently without pain       Modalities Rationale:     decrease edema, decrease inflammation and decrease pain to improve patient's ability to perform pain-free ADLs.     min [] Estim, type/location:                                      []  att     []  unatt     []  w/US     []  w/ice    []  w/heat    min []  Mechanical Traction: type/lbs                   []  pro   []  sup   []  int   []  cont    []  before manual    []  after manual    min []  Ultrasound, settings/location:      min []  Iontophoresis w/ dexamethasone, location:                                               []  take home patch       []  in clinic   10 min [x]  Ice     []  Heat    location/position: R shoulder supine    min []  Vasopneumatic Device, press/temp:    If using vaso (only need to measure limb vaso being performed on)      pre-treatment girth :       post-treatment girth :       measured at (landmark location) :      min []  Other:    [x] Skin assessment post-treatment (if applicable):    [x]  intact    [x]  redness- no adverse reaction                  []redness  adverse reaction:        30 min Therapeutic Exercise:  [x]  See flow sheet   Rationale:      increase ROM and increase strength to improve the patients ability to perform unlimited ADLs. 15 min Manual Therapy: PROM into flexion and ER at varying degrees of ABD; gentle grade 1 PA mobs to GHJ   Rationale:      decrease pain, increase ROM and increase tissue extensibility to improve patient's ability to improve mobility  The manual therapy interventions were performed at a separate and distinct time from the therapeutic activities interventions. Billed With/As:   [x] TE   [] TA   [] Neuro   [] Self Care Patient Education: [x] Review HEP    [] Progressed/Changed HEP based on:   [] positioning   [] body mechanics   [] transfers   [] heat/ice application    [] other:      Other Objective/Functional Measures: Added Tband rows with YTB     Post Treatment Pain Level (on 0 to 10) scale:   1  / 10     ASSESSMENT  Assessment/Changes in Function:     Good scapular control during rows with moderate cues required. Pt is demonstrating improved form with all AROM exercises with less muscle guarding/involuntary twitching noted throughout. []  See Progress Note/Recertification   Patient will continue to benefit from skilled PT services to modify and progress therapeutic interventions, address functional mobility deficits, address ROM deficits, address strength deficits, analyze and address soft tissue restrictions, analyze and cue movement patterns, analyze and modify body mechanics/ergonomics and assess and modify postural abnormalities to attain remaining goals. Progress toward goals / Updated goals:    Progressing towards LTG 3.       PLAN  [x]  Upgrade activities as tolerated YES Continue plan of care   []  Discharge due to :    []  Other:      Therapist: Jorge Tillman DPT    Date: 3/8/2022 Time: 1:57 PM     Future Appointments   Date Time Provider Danny Cortes   3/10/2022 11:45 AM Rodriguez Guido, PT [facility]   3/17/2022 11:45 AM Rodriguez Guido, PT [facility]   3/24/2022 11:45 AM Rodriguez Guido, PT [facility]   3/31/2022 11:45 AM Mary Anne Valencia, PT [facility]

## 2022-03-10 ENCOUNTER — APPOINTMENT (OUTPATIENT)
Dept: PHYSICAL THERAPY | Age: 67
End: 2022-03-10
Payer: MEDICARE

## 2022-03-15 ENCOUNTER — APPOINTMENT (OUTPATIENT)
Dept: PHYSICAL THERAPY | Age: 67
End: 2022-03-15
Payer: MEDICARE

## 2022-03-17 ENCOUNTER — APPOINTMENT (OUTPATIENT)
Dept: PHYSICAL THERAPY | Age: 67
End: 2022-03-17
Payer: MEDICARE

## 2022-03-18 ENCOUNTER — HOSPITAL ENCOUNTER (OUTPATIENT)
Dept: PHYSICAL THERAPY | Age: 67
Discharge: HOME | End: 2022-03-18
Payer: MEDICARE

## 2022-03-18 PROCEDURE — 97110 THERAPEUTIC EXERCISES: CPT

## 2022-03-18 PROCEDURE — 97140 MANUAL THERAPY 1/> REGIONS: CPT

## 2022-03-18 NOTE — PROGRESS NOTES
PHYSICAL THERAPY - DAILY TREATMENT NOTE    Patient Name: Randy Ventura        Date: 3/18/2022  : 1955   YES Patient  Verified  Visit #:     Insurance: Payor: Lynette Rushing / Plan: [place] MEDICARE PART A & B / Product Type: Medicare /      In time: 900 Out time: 3809   Total Treatment Time: 60     BCBS/Medicare Time Tracking (below)   Total Timed Codes (min):  50 1:1 Treatment Time:  45     TREATMENT AREA =  Right shoulder pain [M25.511]    SUBJECTIVE  Pain Level (on 0 to 10 scale):  1  / 10   Medication Changes/New allergies or changes in medical history, any new surgeries or procedures? NO    If yes, update Summary List   Subjective Functional Status/Changes:  []  No changes reported     My daughter had a baby so I have been taking care of her dogs and house, haven't had much time for stretching. Modalities Rationale:     decrease edema, decrease inflammation and decrease pain to improve patient's ability to perform pain-free ADLs.     min [] Estim, type/location:                                      []  att     []  unatt     []  w/US     []  w/ice    []  w/heat    min []  Mechanical Traction: type/lbs                   []  pro   []  sup   []  int   []  cont    []  before manual    []  after manual    min []  Ultrasound, settings/location:      min []  Iontophoresis w/ dexamethasone, location:                                               []  take home patch       []  in clinic   10 min [x]  Ice     []  Heat    location/position: R shoulder supine    min []  Vasopneumatic Device, press/temp:    If using vaso (only need to measure limb vaso being performed on)      pre-treatment girth :       post-treatment girth :       measured at (landmark location) :      min []  Other:    [x] Skin assessment post-treatment (if applicable):    [x]  intact    [x]  redness- no adverse reaction                  []redness  adverse reaction:        35/  30 min Therapeutic Exercise:  [x]  See flow sheet Rationale:      increase ROM and increase strength to improve the patients ability to perform unlimited ADLs. 10 min Manual Therapy: PROM into flexion and ER at varying degrees of ABD; gentle grade 1 PA mobs to GHJ   Rationale:      decrease pain, increase ROM and increase tissue extensibility to improve patient's ability to improve mobility  The manual therapy interventions were performed at a separate and distinct time from the therapeutic activities interventions. Billed With/As:   [x] TE   [] TA   [] Neuro   [] Self Care Patient Education: [x] Review HEP    [] Progressed/Changed HEP based on:   [] positioning   [] body mechanics   [] transfers   [] heat/ice application    [] other:      Other Objective/Functional Measures:    Increased to OTB with tband rows      Post Treatment Pain Level (on 0 to 10) scale:   1  / 10     ASSESSMENT  Assessment/Changes in Function:     Continues to show improvements with AAROM and PROM but is struggling improving active range without compensations. Was encouraged to continue with standing exercises at home with increased independence for RUE. []  See Progress Note/Recertification   Patient will continue to benefit from skilled PT services to modify and progress therapeutic interventions, address functional mobility deficits, address ROM deficits, address strength deficits, analyze and address soft tissue restrictions, analyze and cue movement patterns, analyze and modify body mechanics/ergonomics and assess and modify postural abnormalities to attain remaining goals. Progress toward goals / Updated goals:    Progressing towards LTG 3.       PLAN  [x]  Upgrade activities as tolerated YES Continue plan of care   []  Discharge due to :    []  Other:      Therapist: Michelle Flynn DPT    Date: 3/18/2022 Time: 1:57 PM     Future Appointments   Date Time Provider Danny Cortes   3/18/2022  9:00 AM Mariposa Gonzalez PT [facility]   3/24/2022 11:45 AM Terry Rudolph Bhupinder Mccormack [facility]   3/31/2022 11:45 AM Rolando Wade PT [facility]

## 2022-03-22 ENCOUNTER — APPOINTMENT (OUTPATIENT)
Dept: PHYSICAL THERAPY | Age: 67
End: 2022-03-22
Payer: MEDICARE

## 2022-03-24 ENCOUNTER — HOSPITAL ENCOUNTER (OUTPATIENT)
Dept: PHYSICAL THERAPY | Age: 67
Discharge: HOME | End: 2022-03-24
Payer: MEDICARE

## 2022-03-24 PROCEDURE — 97140 MANUAL THERAPY 1/> REGIONS: CPT

## 2022-03-24 PROCEDURE — 97110 THERAPEUTIC EXERCISES: CPT

## 2022-03-24 NOTE — PROGRESS NOTES
PHYSICAL THERAPY - DAILY TREATMENT NOTE    Patient Name: Guzman Freedman        Date: 3/24/2022  : 1955   YES Patient  Verified  Visit #:   32   of   30  Insurance: Payor: Gee Nones / Plan: [place] MEDICARE PART A & B / Product Type: Medicare /      In time: 7293 Out time:    Total Treatment Time: 60     BCBS/Medicare Time Tracking (below)   Total Timed Codes (min):  50 1:1 Treatment Time:  50     TREATMENT AREA =  Right shoulder pain [M25.511]    SUBJECTIVE  Pain Level (on 0 to 10 scale):  1  / 10   Medication Changes/New allergies or changes in medical history, any new surgeries or procedures? NO    If yes, update Summary List   Subjective Functional Status/Changes:  []  No changes reported     I have some soreness in the morning but it always improves with my stretches       Modalities Rationale:     decrease edema, decrease inflammation and decrease pain to improve patient's ability to perform pain-free ADLs.     min [] Estim, type/location:                                      []  att     []  unatt     []  w/US     []  w/ice    []  w/heat    min []  Mechanical Traction: type/lbs                   []  pro   []  sup   []  int   []  cont    []  before manual    []  after manual    min []  Ultrasound, settings/location:      min []  Iontophoresis w/ dexamethasone, location:                                               []  take home patch       []  in clinic   10 min [x]  Ice     []  Heat    location/position: R shoulder supine    min []  Vasopneumatic Device, press/temp:    If using vaso (only need to measure limb vaso being performed on)      pre-treatment girth :       post-treatment girth :       measured at (landmark location) :      min []  Other:    [x] Skin assessment post-treatment (if applicable):    [x]  intact    [x]  redness- no adverse reaction                  []redness  adverse reaction:        35 min Therapeutic Exercise:  [x]  See flow sheet   Rationale:      increase ROM and increase strength to improve the patients ability to perform unlimited ADLs. 10 min Manual Therapy: PROM into flexion and ER at varying degrees of ABD; gentle grade 1 PA mobs to GHJ   Rationale:      decrease pain, increase ROM and increase tissue extensibility to improve patient's ability to improve mobility  The manual therapy interventions were performed at a separate and distinct time from the therapeutic activities interventions. Billed With/As:   [x] TE   [] TA   [] Neuro   [] Self Care Patient Education: [x] Review HEP    [] Progressed/Changed HEP based on:   [] positioning   [] body mechanics   [] transfers   [] heat/ice application    [] other:      Other Objective/Functional Measures: Added Tband IR/ER to neutral with YTB    Added dowel AAROM IR/Ext     Post Treatment Pain Level (on 0 to 10) scale:   1  / 10     ASSESSMENT  Assessment/Changes in Function:     Pt is showing improved tolerance to therex with less soreness reported throughout all exercises. Pt was able to perform Tband rotational exercises well with minimal compensations although he fatigued quickly and required long rest breaks between sets. []  See Progress Note/Recertification   Patient will continue to benefit from skilled PT services to modify and progress therapeutic interventions, address functional mobility deficits, address ROM deficits, address strength deficits, analyze and address soft tissue restrictions, analyze and cue movement patterns, analyze and modify body mechanics/ergonomics and assess and modify postural abnormalities to attain remaining goals. Progress toward goals / Updated goals:    Progressing towards LTG 3.       PLAN  [x]  Upgrade activities as tolerated YES Continue plan of care   []  Discharge due to :    []  Other:      Therapist: Syeda Davila DPT    Date: 3/24/2022 Time: 1:57 PM     Future Appointments   Date Time Provider Danny Cortes   3/31/2022 11:45 AM Mei Dhillon, PT [facility]

## 2022-03-29 ENCOUNTER — APPOINTMENT (OUTPATIENT)
Dept: PHYSICAL THERAPY | Age: 67
End: 2022-03-29
Payer: MEDICARE

## 2022-03-31 ENCOUNTER — HOSPITAL ENCOUNTER (OUTPATIENT)
Dept: PHYSICAL THERAPY | Age: 67
Discharge: HOME | End: 2022-03-31
Payer: MEDICARE

## 2022-03-31 PROCEDURE — 97140 MANUAL THERAPY 1/> REGIONS: CPT

## 2022-03-31 PROCEDURE — 97110 THERAPEUTIC EXERCISES: CPT

## 2022-03-31 NOTE — PROGRESS NOTES
[address] [facility] AT [address] [place], [place] [address], [address] [address] [place] [phone], [address]  Phone: [phone]  Fax: [phone]  [address] [facility] [facility]AP[facility]          Patient Name: Claire Melo : 1955   Treatment/Medical Diagnosis: Right shoulder pain [M25.511]   Onset Date: 2021    Referral Source: [facility]): 12/15/2021   Prior Hospitalization: See Medical History Provider #: [id number]   Prior Level of Function: Shooting, sports, walking, and ADL's without pain   Comorbidities: High blood pressure, non-specific tremors. Medications: Verified on Patient Summary List   Visits from [facility]: 27 Missed Visits: 0     Goal/Measure of Progress Goal Met? 1. Patient will be independent and compliant with progressive HEP for R shoulder ROM/strength in order to maintain gains made with physical therapy. Status at last Eval: I with current program  Current Status: Independent and compliant 4-5 days/week yes   2. Report ability to resume light ADLs with pain remaining below 2/10 in order to improve functional mobility   Status at last Eval: 1/10 at best  4/10 at worst Current Status: 0/10 at best  1/10 at worst yes   3. Demonstrate improved R shoulder AROM to 120 deg flex and 90 deg Abd in order to improve reaching and grasping demands.    Status at last Eval: Approx 80-85 deg scaption AROM prior to UT compensations Current Status: 100 deg flex  80 deg abd  15 deg ER no   4. Demonstrate improved R shoulder strength to globally 4/5 in order to improve ease with recreational activity. Status at last Eval: Not assessed  Current Status: 4/5 flex/ext  4+/5 IR  3+/5 ER Partially met     Key Functional Changes/Progress: Mr. Brad Pham was last seen on 3/31/2022 at approximately 4 mo. s/p R reverse TSA on 2021.  On final visit, pt reported 1/10 aching pain and improved independence with ADLs like brushing teeth, combing hair, shaving and dressing with RUE. Pt demonstrated slow improvements in strength gains but was consistently compliant with HEP. Pain has remained low and AROM is slowly improving. He is being discharged at this time with progressive HEP in order to maintain gains made with PT and continue to improve mobility at home. Assessments/Recommendations: Discontinue therapy. Progressing towards or have reached established goals. If you have any questions/comments please contact us directly at [phone]. Thank you for allowing us to assist in the care of your patient. Therapist Signature: Arcenio Thompson DPT Date: 5/2/2022   Reporting Period: 2/22/2022-3/31/2022 Time: 57:43 AM     I certify that the above Physical Therapy Services are being furnished while the patient is under my care. I agree with the treatment plan and certify that this therapy is necessary.     Physician Signature:                                                             Date:                                     Time:                                                                       Leanne Cantrell

## 2022-03-31 NOTE — PROGRESS NOTES
PHYSICAL THERAPY - DAILY TREATMENT NOTE    Patient Name: Rony Standard        Date: 3/31/2022  : 1955   YES Patient  Verified  Visit #:   32   of   27  Insurance: Payor: Heidi Roses / Plan: [place] MEDICARE PART A & B / Product Type: Medicare /      In time: 5780 Out time:    Total Treatment Time: 60     BCBS/Medicare Time Tracking (below)   Total Timed Codes (min):  50 1:1 Treatment Time:  50     TREATMENT AREA =  Right shoulder pain [M25.511]    SUBJECTIVE  Pain Level (on 0 to 10 scale):  1  / 10   Medication Changes/New allergies or changes in medical history, any new surgeries or procedures? NO    If yes, update Summary List   Subjective Functional Status/Changes:  []  No changes reported     I feel stronger every day. Modalities Rationale:     decrease edema, decrease inflammation and decrease pain to improve patient's ability to perform pain-free ADLs.     min [] Estim, type/location:                                      []  att     []  unatt     []  w/US     []  w/ice    []  w/heat    min []  Mechanical Traction: type/lbs                   []  pro   []  sup   []  int   []  cont    []  before manual    []  after manual    min []  Ultrasound, settings/location:      min []  Iontophoresis w/ dexamethasone, location:                                               []  take home patch       []  in clinic   10 min [x]  Ice     []  Heat    location/position: R shoulder supine    min []  Vasopneumatic Device, press/temp:    If using vaso (only need to measure limb vaso being performed on)      pre-treatment girth :       post-treatment girth :       measured at (landmark location) :      min []  Other:    [x] Skin assessment post-treatment (if applicable):    [x]  intact    [x]  redness- no adverse reaction                  []redness - adverse reaction:        35 min Therapeutic Exercise:  [x]  See flow sheet   Rationale:      increase ROM and increase strength to improve the patients ability to perform unlimited ADLs. 10 min Manual Therapy: PROM into flexion and ER at varying degrees of ABD; gentle grade 1 PA mobs to GHJ   Rationale:      decrease pain, increase ROM and increase tissue extensibility to improve patient's ability to improve mobility  The manual therapy interventions were performed at a separate and distinct time from the therapeutic activities interventions. Billed With/As:   [x] TE   [] TA   [] Neuro   [] Self Care Patient Education: [x] Review HEP    [] Progressed/Changed HEP based on:   [] positioning   [] body mechanics   [] transfers   [] heat/ice application    [] other:      Other Objective/Functional Measures:    See DC summary     Post Treatment Pain Level (on 0 to 10) scale:   1  / 10     ASSESSMENT  Assessment/Changes in Function:     See DC summary     []  See Progress Note/Recertification      Progress toward goals / Updated goals:    See DC summary     PLAN  [x]  Upgrade activities as tolerated YES Continue plan of care   []  Discharge due to :    []  Other:      Therapist: Seth Prater DPT    Date: 3/31/2022 Time: 1:57 PM     No future appointments.